# Patient Record
Sex: FEMALE | Race: WHITE | NOT HISPANIC OR LATINO | Employment: FULL TIME | URBAN - METROPOLITAN AREA
[De-identification: names, ages, dates, MRNs, and addresses within clinical notes are randomized per-mention and may not be internally consistent; named-entity substitution may affect disease eponyms.]

---

## 2019-07-22 ENCOUNTER — OFFICE VISIT (OUTPATIENT)
Dept: URGENT CARE | Facility: PHYSICIAN GROUP | Age: 39
End: 2019-07-22
Payer: COMMERCIAL

## 2019-07-22 ENCOUNTER — HOSPITAL ENCOUNTER (OUTPATIENT)
Dept: RADIOLOGY | Facility: MEDICAL CENTER | Age: 39
End: 2019-07-22
Attending: NURSE PRACTITIONER
Payer: COMMERCIAL

## 2019-07-22 VITALS
TEMPERATURE: 98.6 F | BODY MASS INDEX: 22.76 KG/M2 | HEIGHT: 67 IN | HEART RATE: 69 BPM | OXYGEN SATURATION: 97 % | SYSTOLIC BLOOD PRESSURE: 126 MMHG | WEIGHT: 145 LBS | RESPIRATION RATE: 16 BRPM | DIASTOLIC BLOOD PRESSURE: 74 MMHG

## 2019-07-22 DIAGNOSIS — R09.81 NASAL CONGESTION WITH RHINORRHEA: ICD-10-CM

## 2019-07-22 DIAGNOSIS — J45.30 MILD PERSISTENT ASTHMA WITHOUT COMPLICATION: ICD-10-CM

## 2019-07-22 DIAGNOSIS — J34.89 NASAL CONGESTION WITH RHINORRHEA: ICD-10-CM

## 2019-07-22 DIAGNOSIS — R05.9 COUGH: ICD-10-CM

## 2019-07-22 DIAGNOSIS — J06.9 URI WITH COUGH AND CONGESTION: ICD-10-CM

## 2019-07-22 DIAGNOSIS — R06.02 SOB (SHORTNESS OF BREATH): ICD-10-CM

## 2019-07-22 PROCEDURE — 71046 X-RAY EXAM CHEST 2 VIEWS: CPT

## 2019-07-22 PROCEDURE — 99204 OFFICE O/P NEW MOD 45 MIN: CPT | Performed by: NURSE PRACTITIONER

## 2019-07-22 RX ORDER — ALBUTEROL SULFATE 90 UG/1
2 AEROSOL, METERED RESPIRATORY (INHALATION) EVERY 6 HOURS PRN
Qty: 8.5 G | Refills: 0 | Status: SHIPPED | OUTPATIENT
Start: 2019-07-22 | End: 2023-10-30

## 2019-07-22 RX ORDER — FLUTICASONE PROPIONATE 50 MCG
2 SPRAY, SUSPENSION (ML) NASAL DAILY
Qty: 1 BOTTLE | Refills: 0 | Status: SHIPPED | OUTPATIENT
Start: 2019-07-22 | End: 2023-10-30

## 2019-07-22 RX ORDER — DOXYCYCLINE HYCLATE 100 MG
100 TABLET ORAL 2 TIMES DAILY
Qty: 14 TAB | Refills: 0 | Status: SHIPPED | OUTPATIENT
Start: 2019-07-22 | End: 2019-07-29

## 2019-07-22 RX ORDER — METHYLPREDNISOLONE 4 MG/1
TABLET ORAL
Qty: 1 KIT | Refills: 0 | Status: SHIPPED | OUTPATIENT
Start: 2019-07-22 | End: 2023-10-30

## 2019-07-22 NOTE — PROGRESS NOTES
"Subjective:      Leola Grant is a 38 y.o. female who presents with Cough (congestion hoarse voice x 2 weeks now Short of breath)            HPI  Cough x 1 week, loss of voice but better. SOB, chest tightness, wheeze. Albuterol not working, but states\"old\". Moved here 5 months ago from [place], [place].. States nasal congestion, runny nose, PND. States green nasal d/c and will cough this up as well. Denies fever or malaise.    PMH:  has no past medical history on file.  MEDS:   Current Outpatient Prescriptions:   •  Pseudoephedrine-APAP-DM (DAYQUIL PO), Take  by mouth., Disp: , Rfl:   •  Albuterol Sulfate 108 (90 Base) MCG/ACT AEROSOL POWDER, BREATH ACTIVATED, Inhale  by mouth., Disp: , Rfl:   •  albuterol 108 (90 Base) MCG/ACT Aero Soln inhalation aerosol, Inhale 2 Puffs by mouth every 6 hours as needed for Shortness of Breath., Disp: 8.5 g, Rfl: 0  •  methylPREDNISolone (MEDROL DOSEPAK) 4 MG Tablet Therapy Pack, Use as directed, Disp: 1 Kit, Rfl: 0  •  fluticasone (FLONASE) 50 MCG/ACT nasal spray, Spray 2 Sprays in nose every day., Disp: 1 Bottle, Rfl: 0  ALLERGIES:   Allergies   Allergen Reactions   • Pcn [Penicillins]      SURGHX: No past surgical history on file.  SOCHX:    FH: Family history was reviewed, no pertinent findings to report    Review of Systems   Constitutional: Positive for malaise/fatigue. Negative for chills and fever.   HENT: Positive for congestion. Negative for ear pain, sinus pain and sore throat.    Respiratory: Positive for cough, shortness of breath and wheezing. Negative for sputum production.    Cardiovascular: Negative for chest pain, palpitations and orthopnea.   Gastrointestinal: Negative for abdominal pain, constipation, diarrhea, nausea and vomiting.   Musculoskeletal: Negative for back pain and myalgias.   Skin: Negative for itching and rash.   Neurological: Negative for dizziness, tingling, sensory change, weakness and headaches.   Endo/Heme/Allergies: Negative for environmental " "allergies.   All other systems reviewed and are negative.         Objective:     /74   Pulse 69   Temp 37 °C (98.6 °F)   Resp 16   Ht 1.702 m (5' 7\")   Wt 65.8 kg (145 lb)   SpO2 97%   BMI 22.71 kg/m²      Physical Exam   Constitutional: She is oriented to person, place, and time. Vital signs are normal. She appears well-developed and well-nourished. She is active and cooperative.  Non-toxic appearance. She does not have a sickly appearance. She does not appear ill. No distress.   HENT:   Head: Normocephalic.   Right Ear: External ear and ear canal normal. A middle ear effusion is present.   Left Ear: External ear and ear canal normal. A middle ear effusion is present.   Nose: Mucosal edema and rhinorrhea present. No sinus tenderness.   Mouth/Throat: Uvula is midline. Mucous membranes are dry. No uvula swelling. Posterior oropharyngeal erythema present.   Eyes: Pupils are equal, round, and reactive to light. Conjunctivae and EOM are normal.   Neck: Normal range of motion. Neck supple.   Cardiovascular: Normal rate and regular rhythm.    Pulmonary/Chest: Effort normal and breath sounds normal. No accessory muscle usage. No respiratory distress. She has no decreased breath sounds. She has no wheezes. She has no rhonchi. She has no rales.   Musculoskeletal: Normal range of motion.   Lymphadenopathy:     She has no cervical adenopathy.   Neurological: She is alert and oriented to person, place, and time.   Skin: Skin is warm and dry. She is not diaphoretic.   Vitals reviewed.         CXR:FINDINGS:  The heart is normal in size.  No pulmonary infiltrates or consolidations are noted.  No pleural effusions are appreciated.     Assessment/Plan:     1. Cough    - REFERRAL TO FOLLOW-UP WITH PRIMARY CARE  - albuterol 108 (90 Base) MCG/ACT Aero Soln inhalation aerosol; Inhale 2 Puffs by mouth every 6 hours as needed for Shortness of Breath.  Dispense: 8.5 g; Refill: 0  - methylPREDNISolone (MEDROL DOSEPAK) 4 MG " Tablet Therapy Pack; Use as directed  Dispense: 1 Kit; Refill: 0  - DX-CHEST-2 VIEWS; Future    2. SOB (shortness of breath)    - REFERRAL TO FOLLOW-UP WITH PRIMARY CARE  - albuterol 108 (90 Base) MCG/ACT Aero Soln inhalation aerosol; Inhale 2 Puffs by mouth every 6 hours as needed for Shortness of Breath.  Dispense: 8.5 g; Refill: 0  - methylPREDNISolone (MEDROL DOSEPAK) 4 MG Tablet Therapy Pack; Use as directed  Dispense: 1 Kit; Refill: 0  - DX-CHEST-2 VIEWS; Future    3. Mild persistent asthma without complication  - REFERRAL TO FOLLOW-UP WITH PRIMARY CARE  - albuterol 108 (90 Base) MCG/ACT Aero Soln inhalation aerosol; Inhale 2 Puffs by mouth every 6 hours as needed for Shortness of Breath.  Dispense: 8.5 g; Refill: 0  - methylPREDNISolone (MEDROL DOSEPAK) 4 MG Tablet Therapy Pack; Use as directed  Dispense: 1 Kit; Refill: 0  - DX-CHEST-2 VIEWS; Future    4. Nasal congestion with rhinorrhea    - fluticasone (FLONASE) 50 MCG/ACT nasal spray; Spray 2 Sprays in nose every day.  Dispense: 1 Bottle; Refill: 0    5. URI with cough and congestion    - doxycycline (VIBRAMYCIN) 100 MG Tab; Take 1 Tab by mouth 2 times a day for 7 days.  Dispense: 14 Tab; Refill: 0    Increase water intake  Get rest  May use Ibuprofen/Tylenol prn for any fever, body aches or throat pain  May take longer acting antihistamine for seasonal allergy symptoms prn  May use saline nasal spray for nasal congestion  May use Flonase or Nasocort for allergen nasal congestion  May gargle with salt water prn for throat discomfort  May drink smoothies for nutrition if too painful to swallow solid foods  Monitor for productive cough, SOB and chest pain/tightness- need re-evaluation

## 2019-07-23 ASSESSMENT — ENCOUNTER SYMPTOMS
SORE THROAT: 0
MYALGIAS: 0
CONSTIPATION: 0
ORTHOPNEA: 0
VOMITING: 0
FEVER: 0
TINGLING: 0
DIARRHEA: 0
WHEEZING: 1
COUGH: 1
SPUTUM PRODUCTION: 0
SHORTNESS OF BREATH: 1
PALPITATIONS: 0
NAUSEA: 0
ABDOMINAL PAIN: 0
CHILLS: 0
SINUS PAIN: 0
WEAKNESS: 0
SENSORY CHANGE: 0
BACK PAIN: 0
HEADACHES: 0
DIZZINESS: 0

## 2019-08-21 ENCOUNTER — OFFICE VISIT (OUTPATIENT)
Dept: URGENT CARE | Facility: PHYSICIAN GROUP | Age: 39
End: 2019-08-21
Payer: COMMERCIAL

## 2019-08-21 ENCOUNTER — TELEPHONE (OUTPATIENT)
Dept: URGENT CARE | Facility: PHYSICIAN GROUP | Age: 39
End: 2019-08-21

## 2019-08-21 ENCOUNTER — HOSPITAL ENCOUNTER (OUTPATIENT)
Dept: RADIOLOGY | Facility: MEDICAL CENTER | Age: 39
End: 2019-08-21
Attending: FAMILY MEDICINE
Payer: COMMERCIAL

## 2019-08-21 VITALS
RESPIRATION RATE: 12 BRPM | TEMPERATURE: 98 F | HEIGHT: 67 IN | HEART RATE: 82 BPM | WEIGHT: 145 LBS | BODY MASS INDEX: 22.76 KG/M2 | DIASTOLIC BLOOD PRESSURE: 90 MMHG | OXYGEN SATURATION: 96 % | SYSTOLIC BLOOD PRESSURE: 122 MMHG

## 2019-08-21 DIAGNOSIS — J01.00 ACUTE NON-RECURRENT MAXILLARY SINUSITIS: ICD-10-CM

## 2019-08-21 DIAGNOSIS — J02.9 SORE THROAT: ICD-10-CM

## 2019-08-21 DIAGNOSIS — R06.02 SOB (SHORTNESS OF BREATH): ICD-10-CM

## 2019-08-21 DIAGNOSIS — R91.1 LUNG NODULE: ICD-10-CM

## 2019-08-21 LAB
INT CON NEG: NEGATIVE
INT CON POS: POSITIVE
S PYO AG THROAT QL: NEGATIVE

## 2019-08-21 PROCEDURE — 87880 STREP A ASSAY W/OPTIC: CPT | Performed by: FAMILY MEDICINE

## 2019-08-21 PROCEDURE — 71046 X-RAY EXAM CHEST 2 VIEWS: CPT

## 2019-08-21 PROCEDURE — 99214 OFFICE O/P EST MOD 30 MIN: CPT | Performed by: FAMILY MEDICINE

## 2019-08-21 RX ORDER — CIPROFLOXACIN 500 MG/1
500 TABLET, FILM COATED ORAL 2 TIMES DAILY
Qty: 14 TAB | Refills: 0 | Status: SHIPPED | OUTPATIENT
Start: 2019-08-21 | End: 2019-08-28

## 2019-08-21 RX ORDER — MOXIFLOXACIN HYDROCHLORIDE 400 MG/1
400 TABLET ORAL DAILY
Qty: 7 TAB | Refills: 0 | Status: SHIPPED | OUTPATIENT
Start: 2019-08-21 | End: 2019-08-21 | Stop reason: CLARIF

## 2019-08-21 NOTE — PROGRESS NOTES
"Chief Complaint   Patient presents with   • Chest Pain     ratteling in chest, chest heaviness, SOB, using inhaler, lethargic, lightheaded   • Pressure Behind the Eyes     pressure in eyes, congestion, sore throat         CHEST/SINUS CONGESTION  This is a new problem. The current episode started 4 wks ago. The problem has been gradually worsening.  She was seen in  on 7/22 and prescribed doxycycline - no improvement.   The problem occurs constantly. The cough is productive of clr sputum. Associated symptoms include : sore throat, headaches, fatigue, nasal congestion.    + subj fevers at home.      Pertinent negatives include no   nausea, vomiting, diarrhea, sweats, weight loss or wheezing. Exertion aggravates the symptoms.   There is no history of asthma.      Past medical history was unremarkable and not pertinent to current issue      Social History     Tobacco Use   • Smoking status: Never Smoker   • Smokeless tobacco: Never Used   Substance Use Topics   • Alcohol use: Not on file   • Drug use: Not on file             Review of Systems   Constitutional: Negative for fever, chills and weight loss.   HENT - denies  ear pain.   Positive congestion, sore throat  Eyes: denies vision changes, discharge  Respiratory: Negative for hemoptysis and wheezing.    Cardiovascular: Negative for chest pain or PND.   Gastrointestinal:  No abdominal pain,  nausea, vomiting, diarrhea.  Negative for  blood in stool.    - no discharge, dysuria, frequency.      Neurological: Negative for dizziness.   + headaches.   musculoskeletal - denies myalgias, calf pain  Psych - denies anxiety/depression/mood changes.  Skin: no itching or rash  All other systems reviewed and are negative.             Objective:     /90   Pulse 82   Temp 36.7 °C (98 °F)   Resp 12   Ht 1.702 m (5' 7\")   Wt 65.8 kg (145 lb)   SpO2 96%     Physical Exam   Constitutional: patient is oriented to person, place, and time. Patient appears well-developed " and well-nourished. No distress.   HENT:   Head: Normocephalic and atraumatic.   Right Ear: External ear normal.   Left Ear: External ear normal.   TMs both normal  Nose: Mucosal edema  present.   There is tenderness to percussion over left and right sinuses  Mouth/Throat: Mucous membranes are normal. No oral lesions.  No posterior pharyngeal erythema.  No oropharyngeal exudate or posterior oropharyngeal edema.   Eyes: Conjunctivae and EOM are normal. Pupils are equal, round, and reactive to light. Right eye exhibits no discharge. Left eye exhibits no discharge. No scleral icterus.   Neck: Normal range of motion. Neck supple. No tracheal deviation present.   Cardiovascular: Normal rate, regular rhythm and normal heart sounds.  Exam reveals no friction rub.    Pulmonary/Chest: Effort normal. No respiratory distress. Patient has no wheezes or rhonchi. Patient has no rales.    Musculoskeletal:  exhibits no edema.   Lymphadenopathy:     Patient has  cervical adenopathy.      Neurological: patient is alert and oriented to person, place, and time.   CN 2-12 intact  Skin: Skin is warm and dry. No rash noted. No erythema.   Psychiatric: patient  has a normal mood and affect.  behavior is normal.   Nursing note and vitals reviewed.         EKG interpretation - normal sinus rhythm. No ST or QRS morphology changes. QT interval is normal. Axis is positive. No signs of ischemia.       Assessment/Plan:        1. Acute non-recurrent maxillary sinusitis     - moxifloxacin (AVELOX) 400 MG Tab; Take 1 Tab by mouth every day for 7 days.  Dispense: 7 Tab; Refill: 0  - Ciclesonide (OMNARIS) 50 MCG/ACT Suspension; Spray 1 Act in nose every day.  Dispense: 1 Inhaler; Refill: 0    2. SOB (shortness of breath)  EKG normal. O2 saturation, lung exam normal.     3. Sore throat  Secondary to #1  Rapid strep neg  - POCT Rapid Strep A    4. Lung nodule  Found incidentally on CT     - REFERRAL TO PULMONOLOGY     Follow up in one week if no  improvement, sooner if symptoms worsen.

## 2019-08-22 ASSESSMENT — ENCOUNTER SYMPTOMS
DYSPNEA AT REST: 1
CHEST TIGHTNESS: 1
SHORTNESS OF BREATH: 1
HEMOPTYSIS: 0
WHEEZING: 1

## 2019-08-22 NOTE — PROGRESS NOTES
"Chief Complaint   Patient presents with   • Chest Pain                         CHEST/SINUS CONGESTION  This is a new problem. The current episode started 4 wks ago. The problem has been gradually worsening.  She was seen in  on 7/22 and prescribed doxycycline - no improvement.   The problem occurs constantly. The cough is productive of clr sputum. Associated symptoms include : sore throat, headaches, fatigue, nasal congestion.    + subj fevers at home.      Pertinent negatives include no   nausea, vomiting, diarrhea, sweats, weight loss or wheezing. Exertion aggravates the symptoms.   There is no history of asthma.      Past medical history was unremarkable and not pertinent to current issue      Social History     Tobacco Use   • Smoking status: Never Smoker   • Smokeless tobacco: Never Used   Substance Use Topics   • Alcohol use: Not on file   • Drug use: Not on file         Family history was reviewed and not pertinent           Review of Systems   Constitutional: Negative for  , chills and weight loss.   HENT - denies  ear pain.   Positive congestion, sore throat  Eyes: denies vision changes, discharge  Respiratory: Negative for hemoptysis and wheezing.    Cardiovascular: Negative for chest pain or PND.   Gastrointestinal:  No abdominal pain,  nausea, vomiting, diarrhea.  Negative for  blood in stool.    - no discharge, dysuria, frequency.      Neurological: Negative for dizziness.   + headaches.   musculoskeletal - denies myalgias, calf pain  Psych - denies anxiety/depression/mood changes.  Skin: no itching or rash  All other systems reviewed and are negative.             Objective:     /90   Pulse 82   Temp 36.7 °C (98 °F)   Resp 12   Ht 1.702 m (5' 7\")   Wt 65.8 kg (145 lb)   SpO2 96%     Physical Exam   Constitutional: patient is oriented to person, place, and time. Patient appears well-developed and well-nourished. No distress.   HENT:   Head: Normocephalic and atraumatic.   Right Ear: " External ear normal.   Left Ear: External ear normal.   TMs both normal  Nose: Mucosal edema  present.   There is tenderness to percussion over left and right sinuses  Mouth/Throat: Mucous membranes are normal. No oral lesions.  No posterior pharyngeal erythema.  No oropharyngeal exudate or posterior oropharyngeal edema.   Eyes: Conjunctivae and EOM are normal. Pupils are equal, round, and reactive to light. Right eye exhibits no discharge. Left eye exhibits no discharge. No scleral icterus.   Neck: Normal range of motion. Neck supple. No tracheal deviation present.   Cardiovascular: Normal rate, regular rhythm and normal heart sounds.  Exam reveals no friction rub.    Pulmonary/Chest: Effort normal. No respiratory distress. Patient has no wheezes or rhonchi. Patient has no rales.    Musculoskeletal:  exhibits no edema.   Lymphadenopathy:     Patient has  cervical adenopathy.      Neurological: patient is alert and oriented to person, place, and time.   CN 2-12 intact  Skin: Skin is warm and dry. No rash noted. No erythema.   Psychiatric: patient  has a normal mood and affect.  behavior is normal.   Nursing note and vitals reviewed.         EKG interpretation - normal sinus rhythm. No ST or QRS morphology changes. QT interval is normal. Axis is positive. No signs of ischemia.     Narrative       8/21/2019 5:07 PM    HISTORY/REASON FOR EXAM:  Cough    TECHNIQUE/EXAM DESCRIPTION:  PA and lateral views of the chest.    COMPARISON:  7/22/2019    FINDINGS:    The heart is not enlarged. No focal consolidation, pleural effusion or pneumothorax is identified.  Costophrenic angles are clear.  Old posttraumatic deformities are seen of left-sided ribs. There is a nodular opacity at the left lung apex measuring 6   mm.          Impression       6 mm nodular opacity at the left lung apex. Follow-up CT chest is recommended.            Assessment/Plan:         1. Acute non-recurrent maxillary sinusitis     - moxifloxacin (AVELOX)  400 MG Tab; Take 1 Tab by mouth every day for 7 days.  Dispense: 7 Tab; Refill: 0  - Ciclesonide (OMNARIS) 50 MCG/ACT Suspension; Spray 1 Act in nose every day.  Dispense: 1 Inhaler; Refill: 0    2. SOB (shortness of breath)  EKG normal.  Chest x-ray was personally interpreted and reviewed. No acute cardiopulmonary findings.  Cardiac silhouette is normal. No hemidiaphragm elevation. No bony abnormalities.      3. Sore throat  Likely from PND  Rapid strep neg      4. Lung nodule  Found incidentally on CT   - REFERRAL TO PULMONOLOGY       Follow up in one week if no improvement, sooner if symptoms worsen.

## 2019-08-23 ENCOUNTER — OFFICE VISIT (OUTPATIENT)
Dept: PULMONOLOGY | Facility: HOSPICE | Age: 39
End: 2019-08-23
Payer: COMMERCIAL

## 2019-08-23 VITALS
BODY MASS INDEX: 21.82 KG/M2 | DIASTOLIC BLOOD PRESSURE: 64 MMHG | HEIGHT: 67 IN | WEIGHT: 139 LBS | SYSTOLIC BLOOD PRESSURE: 112 MMHG | RESPIRATION RATE: 14 BRPM | HEART RATE: 68 BPM | TEMPERATURE: 98.1 F | OXYGEN SATURATION: 98 %

## 2019-08-23 DIAGNOSIS — R05.9 COUGH: ICD-10-CM

## 2019-08-23 DIAGNOSIS — J45.30 MILD PERSISTENT ASTHMA WITHOUT COMPLICATION: ICD-10-CM

## 2019-08-23 DIAGNOSIS — R91.8 PULMONARY NODULES: ICD-10-CM

## 2019-08-23 DIAGNOSIS — R06.02 SOB (SHORTNESS OF BREATH): ICD-10-CM

## 2019-08-23 PROCEDURE — 99244 OFF/OP CNSLTJ NEW/EST MOD 40: CPT | Performed by: INTERNAL MEDICINE

## 2019-08-23 RX ORDER — DOXYCYCLINE HYCLATE 100 MG/1
CAPSULE ORAL
Refills: 0 | COMMUNITY
Start: 2019-07-22 | End: 2021-03-22

## 2019-08-23 ASSESSMENT — PAIN SCALES - GENERAL: PAINLEVEL: NO PAIN

## 2019-08-23 NOTE — PROGRESS NOTES
Chief Complaint   Patient presents with   • New Patient     Abnormal Radiology       HPI:  The patient is a 38-year-old woman who says she has had a long history of asthma that has been very mild.  She has rarely needed to use albuterol as a rescue inhaler.  She is on no chronic inhaled steroids.  She moved to this area from [facility] over a year ago.  Over the past several weeks she has been complaining of a sore throat, headaches, and congestion.  She does not complain of a cough or wheezing.  She has been treated with antibiotics.  A chest x-ray was obtained in July which was normal.  A repeat chest x-ray in August was read as showing a left apical nodule.  The patient is a never smoker.  However she is concerned because there is a history of cancer in her family.  Today she still feels congested but she is not toxic appearing and not particularly short of breath at rest.  Overall, she seems to be getting better.  She has never had pulmonary function testing.    Past Medical History:   Diagnosis Date   • Asthma    • Bronchitis    • Chest tightness    • Cough    • Daytime sleepiness    • Depression    • Dizziness    • Earache    • Fatigue    • Frequent urination    • Hoarseness, persistent    • Lumps on the skin    • Morning headache    • Nasal drainage    • Painful breathing    • Rhinitis    • Shortness of breath    • Sore muscles    • Sore throat, chronic    • Toothache    • Weakness    • Wears glasses        ROS:   Constitutional: Denies fevers, chills, night sweats, fatigue or weight loss  Eyes: Denies vision loss, pain, drainage, double vision  Ears, Nose, Throat: Denies earache, tinnitus, hoarseness  Cardiovascular: Denies chest pain, tightness, palpitations  Respiratory: See HPI  Sleep: Denies, snoring, apnea  GI: Denies abdominal pain, nausea, vomiting, diarrhea  : Denies frequent urination, hematuria, painful urination  Musculoskeletal: Denies back pain, painful joints, sore  muscles  Neurological: Denies headaches, seizures  Skin: Denies rashes, color changes  Psychiatric: Denies depression or thoughts of suicide  Hematologic: Denies bleeding tendency or clotting tendency  Allergic/Immunologic: Denies rhinitis, skin sensitivity    Social History     Socioeconomic History   • Marital status: Single     Spouse name: Not on file   • Number of children: Not on file   • Years of education: Not on file   • Highest education level: Not on file   Occupational History   • Not on file   Social Needs   • Financial resource strain: Not on file   • Food insecurity:     Worry: Not on file     Inability: Not on file   • Transportation needs:     Medical: Not on file     Non-medical: Not on file   Tobacco Use   • Smoking status: Never Smoker   • Smokeless tobacco: Never Used   Substance and Sexual Activity   • Alcohol use: Yes   • Drug use: Yes     Comment: Gummies   • Sexual activity: Not on file   Lifestyle   • Physical activity:     Days per week: Not on file     Minutes per session: Not on file   • Stress: Not on file   Relationships   • Social connections:     Talks on phone: Not on file     Gets together: Not on file     Attends [employer] service: Not on file     Active member of club or organization: Not on file     Attends meetings of clubs or organizations: Not on file     Relationship status: Not on file   • Intimate partner violence:     Fear of current or ex partner: Not on file     Emotionally abused: Not on file     Physically abused: Not on file     Forced sexual activity: Not on file   Other Topics Concern   • Not on file   Social History Narrative   • Not on file     Benadryl [altaryl] and Pcn [penicillins]  Current Outpatient Medications on File Prior to Visit   Medication Sig Dispense Refill   • Pseudoephedrine-APAP-DM (DAYQUIL PO) Take  by mouth.     • albuterol 108 (90 Base) MCG/ACT Aero Soln inhalation aerosol Inhale 2 Puffs by mouth every 6 hours as needed for Shortness of  "Breath. 8.5 g 0   • fluticasone (FLONASE) 50 MCG/ACT nasal spray Spray 2 Sprays in nose every day. 1 Bottle 0   • doxycycline (VIBRAMYCIN) 100 MG Cap TAKE ONE CAPSULE BY MOUTH TWICE A DAY X 7 DAYS  0   • Ciclesonide (OMNARIS) 50 MCG/ACT Suspension Spray 1 Act in nose every day. 1 Inhaler 0   • ciprofloxacin (CIPRO) 500 MG Tab Take 1 Tab by mouth 2 times a day for 7 days. 14 Tab 0   • Albuterol Sulfate 108 (90 Base) MCG/ACT AEROSOL POWDER, BREATH ACTIVATED Inhale  by mouth.     • methylPREDNISolone (MEDROL DOSEPAK) 4 MG Tablet Therapy Pack Use as directed (Patient not taking: Reported on 8/21/2019) 1 Kit 0     No current facility-administered medications on file prior to visit.      /64   Pulse 68   Temp 36.7 °C (98.1 °F) (Oral)   Resp 14   Ht 1.702 m (5' 7\")   Wt 63 kg (139 lb)   SpO2 98%   History reviewed. No pertinent family history.    Physical Exam:    HEENT: PERRLA, EOMI, no scleral icterus, no nasal or oral lesions  Neck: No thyromegaly, no adenopathy, no bruits  M[place]ampatti: Grade II  Lungs: Equal breath sounds, no wheezes or crackles  Heart: Regular rate and rhythm, no gallops or murmurs  Abdomen: Soft, benign, no organomegaly  Extremities: No clubbing, cyanosis, or edema  Neurologic: Cranial nerve, motor, and sensory exam are normal    1. SOB (shortness of breath)    2. Cough    3. Pulmonary nodules    4. Mild persistent asthma without complication        She appears to have had a recent exacerbation of her asthma which is slowly improving.  At this point I do not see the need to add an inhaled corticosteroid.  Certainly she does not need oral steroids.  We will bring her back in several weeks for pulmonary function testing.  Her chest x-ray does show a left apical nodule.  It was probably not seen on the prior film because of location.  We will order a chest CT to further characterize the nodule.  If she does have a noncalcified nodule she will need to be followed up to establish stability.  "

## 2019-08-26 ENCOUNTER — TELEPHONE (OUTPATIENT)
Dept: PULMONOLOGY | Facility: HOSPICE | Age: 39
End: 2019-08-26

## 2019-08-26 NOTE — TELEPHONE ENCOUNTER
Patient called requesting Ct chest order placed by Dr. Beltran be faxed to [facility] per insurance purposes. States insurance does not cover [facility]. Order faxed to [facility] and copy mailed to patient.

## 2019-09-18 ENCOUNTER — APPOINTMENT (OUTPATIENT)
Dept: PULMONOLOGY | Facility: HOSPICE | Age: 39
End: 2019-09-18
Payer: COMMERCIAL

## 2021-03-21 ENCOUNTER — HOSPITAL ENCOUNTER (EMERGENCY)
Facility: MEDICAL CENTER | Age: 41
End: 2021-03-23
Attending: EMERGENCY MEDICINE

## 2021-03-21 DIAGNOSIS — T50.902A INTENTIONAL DRUG OVERDOSE, INITIAL ENCOUNTER (HCC): ICD-10-CM

## 2021-03-21 DIAGNOSIS — F10.929 ALCOHOLIC INTOXICATION WITH COMPLICATION (HCC): ICD-10-CM

## 2021-03-21 LAB
ALBUMIN SERPL BCP-MCNC: 4.5 G/DL (ref 3.2–4.9)
ALBUMIN/GLOB SERPL: 1.7 G/DL
ALP SERPL-CCNC: 60 U/L (ref 30–99)
ALT SERPL-CCNC: 12 U/L (ref 2–50)
ANION GAP SERPL CALC-SCNC: 15 MMOL/L (ref 7–16)
AST SERPL-CCNC: 20 U/L (ref 12–45)
BASOPHILS # BLD AUTO: 0.5 % (ref 0–1.8)
BASOPHILS # BLD: 0.03 K/UL (ref 0–0.12)
BILIRUB SERPL-MCNC: 0.5 MG/DL (ref 0.1–1.5)
BUN SERPL-MCNC: 5 MG/DL (ref 8–22)
CALCIUM SERPL-MCNC: 8.7 MG/DL (ref 8.5–10.5)
CHLORIDE SERPL-SCNC: 108 MMOL/L (ref 96–112)
CO2 SERPL-SCNC: 21 MMOL/L (ref 20–33)
CREAT SERPL-MCNC: 0.43 MG/DL (ref 0.5–1.4)
EOSINOPHIL # BLD AUTO: 0.03 K/UL (ref 0–0.51)
EOSINOPHIL NFR BLD: 0.5 % (ref 0–6.9)
ERYTHROCYTE [DISTWIDTH] IN BLOOD BY AUTOMATED COUNT: 37.3 FL (ref 35.9–50)
ETHANOL BLD-MCNC: 202 MG/DL (ref 0–10)
GLOBULIN SER CALC-MCNC: 2.6 G/DL (ref 1.9–3.5)
GLUCOSE SERPL-MCNC: 87 MG/DL (ref 65–99)
HCG SERPL QL: NEGATIVE
HCT VFR BLD AUTO: 42.8 % (ref 37–47)
HGB BLD-MCNC: 14.9 G/DL (ref 12–16)
IMM GRANULOCYTES # BLD AUTO: 0.01 K/UL (ref 0–0.11)
IMM GRANULOCYTES NFR BLD AUTO: 0.2 % (ref 0–0.9)
LYMPHOCYTES # BLD AUTO: 2.92 K/UL (ref 1–4.8)
LYMPHOCYTES NFR BLD: 50.8 % (ref 22–41)
MCH RBC QN AUTO: 29.8 PG (ref 27–33)
MCHC RBC AUTO-ENTMCNC: 34.8 G/DL (ref 33.6–35)
MCV RBC AUTO: 85.6 FL (ref 81.4–97.8)
MONOCYTES # BLD AUTO: 0.37 K/UL (ref 0–0.85)
MONOCYTES NFR BLD AUTO: 6.4 % (ref 0–13.4)
NEUTROPHILS # BLD AUTO: 2.39 K/UL (ref 2–7.15)
NEUTROPHILS NFR BLD: 41.6 % (ref 44–72)
NRBC # BLD AUTO: 0 K/UL
NRBC BLD-RTO: 0 /100 WBC
PLATELET # BLD AUTO: 218 K/UL (ref 164–446)
PMV BLD AUTO: 10.7 FL (ref 9–12.9)
POTASSIUM SERPL-SCNC: 3.2 MMOL/L (ref 3.6–5.5)
PROT SERPL-MCNC: 7.1 G/DL (ref 6–8.2)
RBC # BLD AUTO: 5 M/UL (ref 4.2–5.4)
SODIUM SERPL-SCNC: 144 MMOL/L (ref 135–145)
WBC # BLD AUTO: 5.8 K/UL (ref 4.8–10.8)

## 2021-03-21 PROCEDURE — 84703 CHORIONIC GONADOTROPIN ASSAY: CPT

## 2021-03-21 PROCEDURE — 94760 N-INVAS EAR/PLS OXIMETRY 1: CPT

## 2021-03-21 PROCEDURE — 80179 DRUG ASSAY SALICYLATE: CPT

## 2021-03-21 PROCEDURE — 36415 COLL VENOUS BLD VENIPUNCTURE: CPT

## 2021-03-21 PROCEDURE — 85025 COMPLETE CBC W/AUTO DIFF WBC: CPT

## 2021-03-21 PROCEDURE — 80053 COMPREHEN METABOLIC PANEL: CPT

## 2021-03-21 PROCEDURE — 93005 ELECTROCARDIOGRAM TRACING: CPT | Performed by: EMERGENCY MEDICINE

## 2021-03-21 PROCEDURE — 80143 DRUG ASSAY ACETAMINOPHEN: CPT

## 2021-03-21 PROCEDURE — 82077 ASSAY SPEC XCP UR&BREATH IA: CPT

## 2021-03-21 PROCEDURE — 99285 EMERGENCY DEPT VISIT HI MDM: CPT

## 2021-03-22 LAB
AMPHET UR QL SCN: NEGATIVE
APAP SERPL-MCNC: <5 UG/ML (ref 10–30)
BARBITURATES UR QL SCN: NEGATIVE
BENZODIAZ UR QL SCN: NEGATIVE
BZE UR QL SCN: NEGATIVE
CANNABINOIDS UR QL SCN: POSITIVE
EKG IMPRESSION: NORMAL
METHADONE UR QL SCN: NEGATIVE
OPIATES UR QL SCN: NEGATIVE
OXYCODONE UR QL SCN: NEGATIVE
PCP UR QL SCN: NEGATIVE
POC BREATHALIZER: 0.04 PERCENT (ref 0–0.01)
PROPOXYPH UR QL SCN: NEGATIVE
SALICYLATES SERPL-MCNC: <1 MG/DL (ref 15–25)

## 2021-03-22 PROCEDURE — 302970 POC BREATHALIZER: Performed by: EMERGENCY MEDICINE

## 2021-03-22 PROCEDURE — 90791 PSYCH DIAGNOSTIC EVALUATION: CPT

## 2021-03-22 PROCEDURE — 80307 DRUG TEST PRSMV CHEM ANLYZR: CPT

## 2021-03-22 PROCEDURE — 99284 EMERGENCY DEPT VISIT MOD MDM: CPT | Performed by: PSYCHIATRY & NEUROLOGY

## 2021-03-22 ASSESSMENT — LIFESTYLE VARIABLES
DOES PATIENT WANT TO STOP DRINKING: CANNOT ASSESS
DO YOU DRINK ALCOHOL: YES

## 2021-03-22 NOTE — ED PROVIDER NOTES
ED Provider Note        Primary care provider: Pcp Pt States None    I verified that the patient was wearing a mask and I was wearing appropriate PPE every time I entered the room. The patient's mask was on the patient at all times during my encounter except for a brief view of the oropharynx.      CHIEF COMPLAINT  Chief Complaint   Patient presents with   • Drug Overdose     Pt found by boyfrienclaire tonight after intentionally overdosing on medications (hx depression/anxiety), appears to have taken 1/2 a bottle of hydroxyzine 10mg tabs ([id number] tabs) and approx 15 tabs of flexeril 15mg tabs, +ETOH. Was alert and oriented on scene but is now drowsy and not answering questions.       HPI  Leola Grant is a 40 y.o. female who presents to the Emergency Department with chief complaint of drug overdose.  Patient apparently took 1510 mg Flexeril's and 1525 mg hydroxyzine ingestion time is estimated to be 10 PM this evening she also drank a large amount of alcohol.  EMS was called by boyfriend she was put on legal hold by EMS and transported here for further evaluation and treatment.  Patient is somnolent but arouses to noxious stimuli further history of present illness limited by altered mental status    REVIEW OF SYSTEMS  As per HPI otherwise limited by altered mental status    *Past medical surgical social family history meds and allergies unobtainable due to altered mental status however below information obtained from chart review.  PAST MEDICAL HISTORY   has a past medical history of Asthma, Bronchitis, Chest tightness, Cough, Daytime sleepiness, Depression, Dizziness, Earache, Fatigue, Frequent urination, Hoarseness, persistent, Lumps on the skin, Morning headache, Nasal drainage, Painful breathing, Rhinitis, Shortness of breath, Sore muscles, Sore throat, chronic, Toothache, Weakness, and Wears glasses.    SURGICAL HISTORY   has a past surgical history that includes primary c section and hysterectomy  "laparoscopy.    SOCIAL HISTORY  Social History     Tobacco Use   • Smoking status: Never Smoker   • Smokeless tobacco: Never Used   Substance Use Topics   • Alcohol use: Yes   • Drug use: Yes     Comment: Gummies      Social History     Substance and Sexual Activity   Drug Use Yes    Comment: Gummies       FAMILY HISTORY  Non-Contributory    CURRENT MEDICATIONS  Home Medications    **Home medications have not yet been reviewed for this encounter**         ALLERGIES  Allergies   Allergen Reactions   • Benadryl [Altaryl]      Manic attack   • Pcn [Penicillins]      hives       PHYSICAL EXAM  VITAL SIGNS: /75   Pulse (!) 123   Resp 16   Ht 1.651 m (5' 5\")   Wt 63 kg (139 lb)   SpO2 96%   BMI 23.13 kg/m²   Pulse ox interpretation: I interpret this pulse ox as normal.  Constitutional: Somnolent disoriented and arouses to noxious stimuli  HEENT: Atraumatic normocephalic, pupils are equal round reactive to light extraocular movements are intact. The nares is clear, external ears are normal, mouth shows dry mucous membranes  Neck: Supple, no JVD no tracheal deviation  Cardiovascular: Tachycardic no murmur rub or gallop 2+ pulses peripherally x4  Thorax & Lungs: No respiratory distress, no wheezes rales or rhonchi, No chest tenderness.   GI: Soft nontender nondistended positive bowel sounds, no peritoneal signs  Skin: Warm dry no acute rash or lesion  Musculoskeletal: Moving all extremities with full range and 5 of 5 strength, no acute  deformity  Neurologic: Moving all extremities in response to noxious stimuli  Psychiatric: Altered somnolent response to noxious stimuli      DIAGNOSTIC STUDIES / PROCEDURES  LABS      Results for orders placed or performed during the hospital encounter of 03/21/21   HCG Qual Serum   Result Value Ref Range    Beta-Hcg Qualitative Serum Negative Negative   Blood Alcohol   Result Value Ref Range    Diagnostic Alcohol 202.0 (H) 0.0 - 10.0 mg/dL   CBC WITH DIFFERENTIAL   Result Value " Ref Range    WBC 5.8 4.8 - 10.8 K/uL    RBC 5.00 4.20 - 5.40 M/uL    Hemoglobin 14.9 12.0 - 16.0 g/dL    Hematocrit 42.8 37.0 - 47.0 %    MCV 85.6 81.4 - 97.8 fL    MCH 29.8 27.0 - 33.0 pg    MCHC 34.8 33.6 - 35.0 g/dL    RDW 37.3 35.9 - 50.0 fL    Platelet Count 218 164 - 446 K/uL    MPV 10.7 9.0 - 12.9 fL    Neutrophils-Polys 41.60 (L) 44.00 - 72.00 %    Lymphocytes 50.80 (H) 22.00 - 41.00 %    Monocytes 6.40 0.00 - 13.40 %    Eosinophils 0.50 0.00 - 6.90 %    Basophils 0.50 0.00 - 1.80 %    Immature Granulocytes 0.20 0.00 - 0.90 %    Nucleated RBC 0.00 /100 WBC    Neutrophils (Absolute) 2.39 2.00 - 7.15 K/uL    Lymphs (Absolute) 2.92 1.00 - 4.80 K/uL    Monos (Absolute) 0.37 0.00 - 0.85 K/uL    Eos (Absolute) 0.03 0.00 - 0.51 K/uL    Baso (Absolute) 0.03 0.00 - 0.12 K/uL    Immature Granulocytes (abs) 0.01 0.00 - 0.11 K/uL    NRBC (Absolute) 0.00 K/uL   COMP METABOLIC PANEL   Result Value Ref Range    Sodium 144 135 - 145 mmol/L    Potassium 3.2 (L) 3.6 - 5.5 mmol/L    Chloride 108 96 - 112 mmol/L    Co2 21 20 - 33 mmol/L    Anion Gap 15.0 7.0 - 16.0    Glucose 87 65 - 99 mg/dL    Bun 5 (L) 8 - 22 mg/dL    Creatinine 0.43 (L) 0.50 - 1.40 mg/dL    Calcium 8.7 8.5 - 10.5 mg/dL    AST(SGOT) 20 12 - 45 U/L    ALT(SGPT) 12 2 - 50 U/L    Alkaline Phosphatase 60 30 - 99 U/L    Total Bilirubin 0.5 0.1 - 1.5 mg/dL    Albumin 4.5 3.2 - 4.9 g/dL    Total Protein 7.1 6.0 - 8.2 g/dL    Globulin 2.6 1.9 - 3.5 g/dL    A-G Ratio 1.7 g/dL   ESTIMATED GFR   Result Value Ref Range    GFR If African American >60 >60 mL/min/1.73 m 2    GFR If Non African American >60 >60 mL/min/1.73 m 2   EKG   Result Value Ref Range    Report       [facility] Emergency Dept.    Test Date:  2021  Pt Name:    ANA STREETER              Department: ER  MRN:        [id number]                      Room:       [facility]  Gender:     Female                       Technician: [id number]  :        1980                   Requested  "By:ER TRIAGE PROTOCOL  Order #:    [id number]                    Reading MD:    Measurements  Intervals                                Axis  Rate:       122                          P:          76  ND:         184                          QRS:        19  QRSD:       68                           T:          -84  QT:         256  QTc:        365    Interpretive Statements  INCOMPLETE ANALYSIS DUE TO MISSING DATA IN PRECORDIAL LEAD(S)  SINUS TACHYCARDIA  PROBABLE LEFT ATRIAL ABNORMALITY  LOW VOLTAGE IN FRONTAL LEADS  BORDERLINE T ABNORMALITIES, DIFFUSE LEADS  MISSING LEAD(S): V4  No previous ECG available for comparison         All labs reviewed by me.        COURSE & MEDICAL DECISION MAKING  Pertinent Labs & Imaging studies reviewed. (See chart for details)    11:14 PM - Patient seen and examined at bedside.       Patient noted to have slightly elevated blood pressure likely circumstantial secondary to presenting complaint. Referred to primary care physician for further evaluation.        Medical Decision Makin-year-old female reportedly took overdose of Flexeril and hydroxyzine this evening she was slightly tachycardic at arrival she is had no other anticholinergic toxidrome no QRS or QT prolongation doing otherwise well alcohol level is over 200 she has been observed for several hours and stable.  Poison control recommended 8-hour observation.  She is required no benzodiazepines during my observation.  At this point she still pending sobriety from alcohol and when this is achieved she will be further question of the intentions of her ingestion this evening.  She will likely require ongoing evaluation and possible psychiatric evaluation/placement I discussed.  I discussed this with my partner at time of shift change and care is transferred in guarded condition.    BP (!) 96/58   Pulse 84   Temp 36.7 °C (98 °F) (Temporal)   Resp (!) 24   Ht 1.651 m (5' 5\")   Wt 63 kg (139 lb)   SpO2 92%   BMI 23.13 kg/m² "             FINAL IMPRESSION  1. Intentional drug overdose, initial encounter (HCC) Active   2. Alcoholic intoxication with complication (HCC) Active          This dictation has been created using voice recognition software and/or scribes. The accuracy of the dictation is limited by the abilities of the software and the expertise of the scribes. I expect there may be some errors of grammar and possibly content. I made every attempt to manually correct the errors within my dictation. However, errors related to voice recognition software and/or scribes may still exist and should be interpreted within the appropriate context.

## 2021-03-22 NOTE — ED NOTES
Report given to ARY Beltre. Pt moved to [address] on [facility], in direct view of caleb ([facility]).

## 2021-03-22 NOTE — ED NOTES
Patient's home medications have been reviewed by the pharmacy team. Pt states she does not take any medications at home.     Past Medical History:   Diagnosis Date   • Asthma    • Bronchitis    • Chest tightness    • Cough    • Daytime sleepiness    • Depression    • Dizziness    • Earache    • Fatigue    • Frequent urination    • Hoarseness, persistent    • Lumps on the skin    • Morning headache    • Nasal drainage    • Painful breathing    • Rhinitis    • Shortness of breath    • Sore muscles    • Sore throat, chronic    • Toothache    • Weakness    • Wears glasses        Patient's Medications   New Prescriptions    No medications on file   Previous Medications    ALBUTEROL 108 (90 BASE) MCG/ACT AERO SOLN INHALATION AEROSOL    Inhale 2 Puffs by mouth every 6 hours as needed for Shortness of Breath.    CICLESONIDE (OMNARIS) 50 MCG/ACT SUSPENSION    Spray 1 Act in nose every day.    FLUTICASONE (FLONASE) 50 MCG/ACT NASAL SPRAY    Spray 2 Sprays in nose every day.    METHYLPREDNISOLONE (MEDROL DOSEPAK) 4 MG TABLET THERAPY PACK    Use as directed   Modified Medications    No medications on file   Discontinued Medications    ALBUTEROL SULFATE 108 (90 BASE) MCG/ACT AEROSOL POWDER, BREATH ACTIVATED    Inhale  by mouth.    DOXYCYCLINE (VIBRAMYCIN) 100 MG CAP    TAKE ONE CAPSULE BY MOUTH TWICE A DAY X 7 DAYS    PSEUDOEPHEDRINE-APAP-DM (DAYQUIL PO)    Take  by mouth.     A:  Medications do not appear to be contributing to current complaints.       P:    No recommendations at this time. Will follow Psychiatry's recommendations.     Tracey Stewart, PharmD, BCPS

## 2021-03-22 NOTE — ED NOTES
Pt given water to drink. To be moved to green 35 & remain on 1:1 direct observation. Plan is for pt to be reassessed at 0800/medically cleared, then ALERT team assessment. Pt aware of plan.

## 2021-03-22 NOTE — ED NOTES
Security called to search belongings, both removed from pt on arrival and duffel bag dropped off by partner, Dakota.  Pt's daughter - Dale Grant - will be stopping by to  keys from duffel bag as herself and pt share an apartment, and partner Dakota has to leave back to [place]. Security aware.

## 2021-03-22 NOTE — ED NOTES
Pt resting with eyes closed. Even et unlabored resp noted @ this time. Pt remains on monitor. Will cont to monitor pt.

## 2021-03-22 NOTE — ED PROVIDER NOTES
ED Provider Note    3/22/2021 7:54 AM -patient initially evaluated earlier this evening by Dr. Young, status post Atarax and Flexeril overdose.  The patient has been observed in the emergency room for several hours now has not had any decompensation she is medically cleared for psychiatric evaluation, legal hold has been completed by Dr. Young.  No adverse events reported.      2:22 PM-legal hold completed.  Patient stable for transfer.

## 2021-03-22 NOTE — ED NOTES
Pt cont to rest with eyes closed sitter remains in place. Will cont to monitor pt. Diet ordered and waiting for meal @ this time

## 2021-03-22 NOTE — ED NOTES
Pt blew a 0.045 alert team is aware. Will continue to monitor. Sitter Cheikh is sitting outside of pt room.

## 2021-03-22 NOTE — DISCHARGE PLANNING
Alert Team  Confirmed with PFA/PAR that pt's [facility] insurance policy inactive; per [facility] website, her policy termed 9/2019.

## 2021-03-22 NOTE — ED NOTES
Urine collected et sent to lab @ this time. Pt ambulate to restroom and back with SBA. Pt tolerated well. Pt awake and sitting up

## 2021-03-22 NOTE — ED NOTES
Pt continues to rest with eyes closed, equal chest rise and fall, NSR on monitor. Remains in direct view of 1:1 sitter ER Tech - Cheikh.

## 2021-03-22 NOTE — ED NOTES
Dr. Guerrier assessed. Pt not responding to questions or commands, squirming, drowsy. Protecting own airway. All clothing removed from pt and placed in belonging bag, charge RN aware of need for 1:1 sitter for high  SI risk. All items removed from room except for essential monitoring equipment and suction/Ambubag.

## 2021-03-22 NOTE — ED NOTES
Repot given by Kane. Pt will move to room 35. Per report p will be medically cleared at 8AM to talk to alert team. Pt is on a legal hold as of now.

## 2021-03-22 NOTE — ED NOTES
"Pt's eyes open - now oriented x4, pt states she knows why she is here and states she doesn't feel suicidal, states she thinks \"it was just a moment\", due to alcohol. Explained legal hold, pt agreeable/understanding. Asking for water. Denies h/a or nausea. Dr. Young aware.  "

## 2021-03-22 NOTE — DISCHARGE PLANNING
"Alert Team  Spoke with PFA about this pt, who felt she definitely had insurance.  She does not currently have insurance.  [facility], [facility] and [facility] show no active commercial policies.  PFA reports pt does not qualify for Medicaid d/t \"makes too much.\"      "

## 2021-03-22 NOTE — ED NOTES
Pt sitting up and alert per this nurse allowed pt to use her cell phone to call work and update her daughter. Per alert team pt remains a hold and will be placed in patient. Pt is polite and cooperatve @ this time

## 2021-03-22 NOTE — DISCHARGE PLANNING
Medical Social Work    Referral: Legal Hold    Intervention: Legal Hold Paperwork given to SW by [employer] RN Kacy Ty    Legal Hold Initiated: Date: 03- Time: 2238    Patient’s Insurance Listed on Face Sheet: None    Referrals sent to: [facility]    This referral contains the following information:  1) Face sheet __x__  2) Page 1 and Page 2 of Legal Hold __x__  3) Alert Team Assessment/Psych Assessment __x__  4) Head to toe physical exam __x__  5) Urine Drug Screen _x___  6) Blood Alcohol _x___  7) Vital signs __x__  8) Pregnancy test when applicable _NA__  9) Medications list __x__    Plan: Patient will transfer to mental health facility once acceptance is obtained
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
## 2021-03-22 NOTE — ED TRIAGE NOTES
Leola Grant  40 y.o.  female  Chief Complaint   Patient presents with   • Drug Overdose     Pt found by boyfriend tonight after intentionally overdosing on medications (hx depression/anxiety), appears to have taken 1/2 a bottle of hydroxyzine 10mg tabs ([id number] tabs) and approx 15 tabs of flexeril 5mg tabs, +ETOH. Was alert and oriented on scene but is now drowsy and not answering questions.       Approx 500 mL NS with EMS.

## 2021-03-22 NOTE — ED NOTES
Med Rec complete per Pt at bedside  Allergies reviewed  No oral ABX in the last 14 days.    Pt states she does not take any medications at home.

## 2021-03-22 NOTE — ED NOTES
Poison control contacted - case # [id number]. Advised to monitor for tachycardia/agitation/seizure/urinary retention from anticholinergic response, recommending 8 hr observation at this time +cardiac monitoring for tachycardia and seizure precautions, benzos prn for same.

## 2021-03-22 NOTE — ED NOTES
Report from Patt MCALLISTER. Assumed pt care @ this time. 1:1 observation still @ this time. Report to current 1:1 sitter. Pt resting with eyes closed. Pt does wake to voice. Pt currenlty not requesting any needs @ this time.Will cont to monitor pt
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
## 2021-03-22 NOTE — DISCHARGE PLANNING
Filed petition to the court via [employer]. Waiting on verified petition.    Received verified petition from the court. Sent copy to unit TAMRAW.

## 2021-03-22 NOTE — ED NOTES
Break RN: Pt appears to be sleeping w/ even chest rise and fall. Ellie lloyd in direct view of patient.

## 2021-03-22 NOTE — ED NOTES
Pt belongings searched by security and placed in Locker 13 with facesheets & pt labels. Pink duffel bag dropped off by partner Dakota placed outside of locker (large).

## 2021-03-22 NOTE — ED NOTES
Pt now opening eyes spontaneously, remains drowsy, mumbling responses but following commands. Unable to complete full neuro assessment as pt refusing to answer some questions - is oriented to place, self and year.

## 2021-03-22 NOTE — ED NOTES
"Pt's partner Dakota called - informed that staff could not give him any information on pt status as he is not on file as her primary contact. Dakota informed this RN that himself and pt were away on a trip, \"trying to mend things\", pt was drinking all day, became ++ intoxicated and became agitated, then took pills shortly after (not visualized by Dakota, but he found empty bottles). States that pt never expressed intent to harm self/suicidal thoughts/etc. Dakota is going to drop off pt's phone and \"care package\", informed that same could not be given to pt at this time but would be stored safely in a locker.    Dakota - partner # [phone].  "

## 2021-03-23 VITALS
OXYGEN SATURATION: 95 % | SYSTOLIC BLOOD PRESSURE: 120 MMHG | TEMPERATURE: 98 F | WEIGHT: 139 LBS | HEIGHT: 65 IN | HEART RATE: 80 BPM | BODY MASS INDEX: 23.16 KG/M2 | DIASTOLIC BLOOD PRESSURE: 85 MMHG | RESPIRATION RATE: 16 BRPM

## 2021-03-23 NOTE — ED PROVIDER NOTES
"ED Provider Note    8:58 AM    Subjective:  Patient is awaiting transfer to psychiatric facility.  She has no complaints.  She has been cooperative    Objective: BP (!) 95/55   Pulse 79   Temp 36.7 °C (98 °F) (Temporal)   Resp 18   Ht 1.651 m (5' 5\")   Wt 63 kg (139 lb)   SpO2 95%   BMI 23.13 kg/m² .  Calm and cooperative. Generally nontoxic. No respiratory distress.No skin rash. Extremities unremarkable.    Laboratory data was reviewed.    Assesment/Plan:   1.  Intentional overdose-proceed with plan for psychiatric care      Electronically signed by: Wong Peck M.D., 3/23/2021 8:58 AM    "

## 2021-03-23 NOTE — ED NOTES
Sister Soumya updated on plan of care, okayed with pt. Pt given cell phone  per request. Soumya number [phone]

## 2021-03-23 NOTE — ED NOTES
Bed linen changed, pt provided with toothbrush and toothpaste for oral hygiene. Vitals rechecked, VSS. Pt resting with no needs at this time

## 2021-03-23 NOTE — DISCHARGE PLANNING
Kayla from [facility] called and they will accept Pt.   Dr Nance will be admitting physician.    [facility] [employer] arranged for Legal Hold Transfer  Transfer time will be [id number]    Transfer Packet completed with Original Legal Hold placed inside.  RN updated on transfer and transfer time.     Syed at [facility] updated on [facility] [employer] time.

## 2021-03-23 NOTE — DISCHARGE PLANNING
Legal Hold    Referral: Legal Hold Court     Intervention: Pt presented for legal hold meeting with .  advised pt will meet with court MD's via telemedicine monitor to contest the legal hold.      Plan: Pt will present to telemedicine mental health to meet with court physicians 3/24. Will call bedside RN once time has been determined.

## 2021-03-23 NOTE — ED NOTES
Pt sleeping comfortably in bed, no signs of distress, even rise and fall of chest. Sitter within direct view of pt.

## 2021-03-23 NOTE — DISCHARGE PLANNING
Alert Team  Pt not due to be seen by psychiatry today and likely won't be d/t high pt volume.  She was seen yesterday and LH extended by psychiatry.    Pt continues to minimize, focusing on discharge.  I have provided pt with CSP to fill out and resources for psychiatry/counseling to look over to start planning for f/u.  I have given her a few handouts about depression and SAs.  WCTM

## 2021-03-23 NOTE — DISCHARGE PLANNING
Alert Team:    Patient rested throughout the night without incident with sitter directly observing for patient safety outside of room. Patient seen by psychiatry yesterday and legal hold extended and continues to await transfer to [facility] due to uninsured status. Alert Team will continue to monitor.

## 2021-03-23 NOTE — ED NOTES
Report received from ARY Bird. Assumed care of pt, pt resting in bed, 1:1 sitter in direct view of pt.

## 2021-03-23 NOTE — DISCHARGE PLANNING
"Alert Team  Discussed dc planning with pt, in case she is released from this ER prior to transfer.  SW has not heard back about [facility].  Pt more accepting of advising from staff, even agreeing, after declining yesterday, to start some psych meds.  \"I know I need to be on them.  I have been in the past and will try them again.\"  CSP done; resource list given and articles about SA/etoh use given to pt.  Message psychiatrist to let her know pt willing to start meds.    [facility]  Crisis/Safety Plan    Name:  Leola Grant  MRN:  [id number]  Date:  3/23/2021    Warning signs that a crisis may be developing for me or I may be at risk:  1) sudden sadness  2) extreme anxiety  3)feeling overwhelmed    Coping strategies I can use on my own (relaxation, physical activity, etc):  1) going for a walk  2) reading a book  3) listen to calming music    Ways I can make my environment safe:  1)get out more for fresh air  2)get rid of old medications in the home  3)let more light in home    Things I want to tell myself when I feel a crisis developin) It's not worth it  2) You are loved  3)Never give up    People I can contact for support or distraction (and their phone numbers):  1) Enmanuel Bravo [phone]  2) Alee Baird  [phone]  3)Kavitha Bravo [phone]    If I’m not able to reach my support people, or the above strategies don’t help, I can contact the following professionals, agencies, or hotlines:  1) Crisis Call Center ():  [phone] -OR- [phone]  2) Crisis Text Line ():  Text CARE TO 141474  3) [facility] [phone]  4) [facility]/see resource list for #    Kacy Ty R.N.    "

## 2022-10-12 ENCOUNTER — APPOINTMENT (OUTPATIENT)
Dept: RADIOLOGY | Facility: MEDICAL CENTER | Age: 42
End: 2022-10-12
Attending: EMERGENCY MEDICINE

## 2022-10-12 ENCOUNTER — HOSPITAL ENCOUNTER (EMERGENCY)
Facility: MEDICAL CENTER | Age: 42
End: 2022-10-12
Attending: EMERGENCY MEDICINE

## 2022-10-12 VITALS
SYSTOLIC BLOOD PRESSURE: 109 MMHG | RESPIRATION RATE: 18 BRPM | HEIGHT: 67 IN | BODY MASS INDEX: 15.54 KG/M2 | DIASTOLIC BLOOD PRESSURE: 77 MMHG | OXYGEN SATURATION: 98 % | HEART RATE: 51 BPM | TEMPERATURE: 98 F | WEIGHT: 98.99 LBS

## 2022-10-12 DIAGNOSIS — R55 SYNCOPE, UNSPECIFIED SYNCOPE TYPE: ICD-10-CM

## 2022-10-12 LAB
ALBUMIN SERPL BCP-MCNC: 4.4 G/DL (ref 3.2–4.9)
ALBUMIN/GLOB SERPL: 1.8 G/DL
ALP SERPL-CCNC: 51 U/L (ref 30–99)
ALT SERPL-CCNC: 9 U/L (ref 2–50)
ANION GAP SERPL CALC-SCNC: 12 MMOL/L (ref 7–16)
AST SERPL-CCNC: 20 U/L (ref 12–45)
BASOPHILS # BLD AUTO: 0.5 % (ref 0–1.8)
BASOPHILS # BLD: 0.04 K/UL (ref 0–0.12)
BILIRUB SERPL-MCNC: 0.9 MG/DL (ref 0.1–1.5)
BUN SERPL-MCNC: 13 MG/DL (ref 8–22)
CALCIUM SERPL-MCNC: 9.2 MG/DL (ref 8.5–10.5)
CHLORIDE SERPL-SCNC: 106 MMOL/L (ref 96–112)
CO2 SERPL-SCNC: 22 MMOL/L (ref 20–33)
CREAT SERPL-MCNC: 0.45 MG/DL (ref 0.5–1.4)
D DIMER PPP IA.FEU-MCNC: 0.28 UG/ML (FEU) (ref 0–0.5)
EKG IMPRESSION: NORMAL
EOSINOPHIL # BLD AUTO: 0.04 K/UL (ref 0–0.51)
EOSINOPHIL NFR BLD: 0.5 % (ref 0–6.9)
ERYTHROCYTE [DISTWIDTH] IN BLOOD BY AUTOMATED COUNT: 37.9 FL (ref 35.9–50)
GFR SERPLBLD CREATININE-BSD FMLA CKD-EPI: 123 ML/MIN/1.73 M 2
GLOBULIN SER CALC-MCNC: 2.5 G/DL (ref 1.9–3.5)
GLUCOSE SERPL-MCNC: 83 MG/DL (ref 65–99)
HCG SERPL QL: NEGATIVE
HCT VFR BLD AUTO: 42.2 % (ref 37–47)
HGB BLD-MCNC: 14.8 G/DL (ref 12–16)
IMM GRANULOCYTES # BLD AUTO: 0.01 K/UL (ref 0–0.11)
IMM GRANULOCYTES NFR BLD AUTO: 0.1 % (ref 0–0.9)
LYMPHOCYTES # BLD AUTO: 2.34 K/UL (ref 1–4.8)
LYMPHOCYTES NFR BLD: 31.1 % (ref 22–41)
MCH RBC QN AUTO: 30.1 PG (ref 27–33)
MCHC RBC AUTO-ENTMCNC: 35.1 G/DL (ref 33.6–35)
MCV RBC AUTO: 85.8 FL (ref 81.4–97.8)
MONOCYTES # BLD AUTO: 0.46 K/UL (ref 0–0.85)
MONOCYTES NFR BLD AUTO: 6.1 % (ref 0–13.4)
NEUTROPHILS # BLD AUTO: 4.64 K/UL (ref 2–7.15)
NEUTROPHILS NFR BLD: 61.7 % (ref 44–72)
NRBC # BLD AUTO: 0 K/UL
NRBC BLD-RTO: 0 /100 WBC
NT-PROBNP SERPL IA-MCNC: 40 PG/ML (ref 0–125)
PLATELET # BLD AUTO: 226 K/UL (ref 164–446)
PMV BLD AUTO: 11.2 FL (ref 9–12.9)
POTASSIUM SERPL-SCNC: 3.5 MMOL/L (ref 3.6–5.5)
PROT SERPL-MCNC: 6.9 G/DL (ref 6–8.2)
RBC # BLD AUTO: 4.92 M/UL (ref 4.2–5.4)
SODIUM SERPL-SCNC: 140 MMOL/L (ref 135–145)
WBC # BLD AUTO: 7.5 K/UL (ref 4.8–10.8)

## 2022-10-12 PROCEDURE — 70450 CT HEAD/BRAIN W/O DYE: CPT

## 2022-10-12 PROCEDURE — 94760 N-INVAS EAR/PLS OXIMETRY 1: CPT

## 2022-10-12 PROCEDURE — 93005 ELECTROCARDIOGRAM TRACING: CPT

## 2022-10-12 PROCEDURE — 85379 FIBRIN DEGRADATION QUANT: CPT

## 2022-10-12 PROCEDURE — 36415 COLL VENOUS BLD VENIPUNCTURE: CPT

## 2022-10-12 PROCEDURE — 99284 EMERGENCY DEPT VISIT MOD MDM: CPT

## 2022-10-12 PROCEDURE — 700105 HCHG RX REV CODE 258: Performed by: EMERGENCY MEDICINE

## 2022-10-12 PROCEDURE — 84703 CHORIONIC GONADOTROPIN ASSAY: CPT

## 2022-10-12 PROCEDURE — 93005 ELECTROCARDIOGRAM TRACING: CPT | Performed by: EMERGENCY MEDICINE

## 2022-10-12 PROCEDURE — 80053 COMPREHEN METABOLIC PANEL: CPT

## 2022-10-12 PROCEDURE — 84443 ASSAY THYROID STIM HORMONE: CPT

## 2022-10-12 PROCEDURE — 71045 X-RAY EXAM CHEST 1 VIEW: CPT

## 2022-10-12 PROCEDURE — 85025 COMPLETE CBC W/AUTO DIFF WBC: CPT

## 2022-10-12 PROCEDURE — 83880 ASSAY OF NATRIURETIC PEPTIDE: CPT

## 2022-10-12 RX ORDER — SODIUM CHLORIDE 9 MG/ML
1000 INJECTION, SOLUTION INTRAVENOUS ONCE
Status: COMPLETED | OUTPATIENT
Start: 2022-10-12 | End: 2022-10-12

## 2022-10-12 RX ADMIN — SODIUM CHLORIDE 1000 ML: 9 INJECTION, SOLUTION INTRAVENOUS at 17:45

## 2022-10-12 ASSESSMENT — FIBROSIS 4 INDEX: FIB4 SCORE: 1.88

## 2022-10-12 NOTE — ED TRIAGE NOTES
Chief Complaint   Patient presents with    Syncope     Pt had episode of syncope while walking in [place] two days ago.  Hit L side of face.  Uncertain of how long she lost consciousness.       Pt reports L side of face is sore.  She is anxious and tearful in triage.  No s/s of neuro deficit.

## 2022-10-13 LAB — TSH SERPL DL<=0.005 MIU/L-ACNC: 1 UIU/ML (ref 0.38–5.33)

## 2022-10-13 NOTE — ED PROVIDER NOTES
ED Provider Note    CHIEF COMPLAINT  Chief Complaint   Patient presents with    Syncope     Pt had episode of syncope while walking in [place] two days ago.  Hit L side of face.  Uncertain of how long she lost consciousness.         HPI  Leola He is a 41 y.o. female who presents to the emergency department after syncopal episode.  Past medical history as documented below.  She notes that over the last couple weeks has been feeling generally unwell, fatigued and somewhat jittery all over.  She notes that even in a more prolonged states she has had extreme difficulty losing weight and has recurrently lost weight and has had significant difficulty keeping weight on.    A few nights ago she was working on the computer for prolonged hours upwards of 8 to 9 hours.  She then started to feel well and lightheaded dizzy.  She tried to make it to the bathroom as she felt like she might pass out.  After walking up the stairs she then did pass out and believes she hit her head on some nearby object.  She is completely amnestic to this event or event further conversation throughout that evening.  Now has continued to feel unwell and ultimately decided come to the emergency department for further care and work-up.    Currently her primary complaint is generalized fatigue.    She additionally notes that she has had a neck mass which was thought to be possible thyroid nodule which she was supposed to get followed up however she has not.  She does not currently have a primary care physician.  Denies any chest pain or palpitations.    REVIEW OF SYSTEMS  See HPI for further details. All other systems are negative.     PAST MEDICAL HISTORY   has a past medical history of Asthma, Bronchitis, Chest tightness, Cough, Daytime sleepiness, Depression, Dizziness, Earache, Fatigue, Frequent urination, Hoarseness, persistent, Lumps on the skin, Morning headache, Nasal drainage, Painful breathing, Rhinitis, Shortness of breath, Sore  "muscles, Sore throat, chronic, Toothache, Weakness, and Wears glasses.    SOCIAL HISTORY  Social History     Tobacco Use    Smoking status: Never    Smokeless tobacco: Never   Vaping Use    Vaping Use: Never used   Substance and Sexual Activity    Alcohol use: Yes    Drug use: Yes     Comment: Gummies    Sexual activity: Not on file       SURGICAL HISTORY   has a past surgical history that includes primary c section and hysterectomy laparoscopy.    CURRENT MEDICATIONS  Home Medications       Reviewed by Grace Chery R.N. (Registered Nurse) on 10/12/22 at 1648  Med List Status: Not Addressed     Medication Last Dose Status   albuterol 108 (90 Base) MCG/ACT Aero Soln inhalation aerosol  Active   Ciclesonide (OMNARIS) 50 MCG/ACT Suspension  Active   fluticasone (FLONASE) 50 MCG/ACT nasal spray  Active   methylPREDNISolone (MEDROL DOSEPAK) 4 MG Tablet Therapy Pack  Active                    ALLERGIES  Allergies   Allergen Reactions    Benadryl [Altaryl]      Manic attack    Pcn [Penicillins]      hives       PHYSICAL EXAM  VITAL SIGNS: /77   Pulse (!) 51   Temp 36.7 °C (98 °F) (Temporal)   Resp 18   Ht 1.702 m (5' 7\")   Wt 44.9 kg (98 lb 15.8 oz)   SpO2 98%   BMI 15.50 kg/m²  @YOVANI[[id number]::@   Pulse ox interpretation: I interpret this pulse ox as normal.  Constitutional: Alert in no apparent distress.  HENT: No signs of trauma, Bilateral external ears normal, Nose normal.   Eyes: Pupils are equal and reactive, Conjunctiva normal, Non-icteric.   Neck: Normal range of motion, No tenderness, Supple.  This nodular density appreciated to the right of midline and in location of thyroid.  Cardiovascular: Regular rate and rhythm, no murmurs.   Thorax & Lungs: Normal breath sounds, No respiratory distress, No wheezing, No chest tenderness.   Abdomen: Bowel sounds normal, Soft, No tenderness, No masses, No pulsatile masses. No peritoneal signs.  Skin: Warm, Dry, No erythema, No rash.   Back: No bony tenderness, " No CVA tenderness.   Extremities: Intact distal pulses, No edema, No tenderness, No cyanosis,  Negative Marlon's sign.   Musculoskeletal: Good range of motion in all major joints. No tenderness to palpation or major deformities noted.   Neurologic: Alert , Normal motor function, Normal sensory function, No focal deficits noted.   Psychiatric: Affect normal, Judgment normal, Mood normal.       DIAGNOSTIC STUDIES / PROCEDURES      LABS  Results for orders placed or performed during the hospital encounter of 10/12/22   HCG Qual Serum   Result Value Ref Range    Beta-Hcg Qualitative Serum Negative Negative   CBC w/ Differential   Result Value Ref Range    WBC 7.5 4.8 - 10.8 K/uL    RBC 4.92 4.20 - 5.40 M/uL    Hemoglobin 14.8 12.0 - 16.0 g/dL    Hematocrit 42.2 37.0 - 47.0 %    MCV 85.8 81.4 - 97.8 fL    MCH 30.1 27.0 - 33.0 pg    MCHC 35.1 (H) 33.6 - 35.0 g/dL    RDW 37.9 35.9 - 50.0 fL    Platelet Count 226 164 - 446 K/uL    MPV 11.2 9.0 - 12.9 fL    Neutrophils-Polys 61.70 44.00 - 72.00 %    Lymphocytes 31.10 22.00 - 41.00 %    Monocytes 6.10 0.00 - 13.40 %    Eosinophils 0.50 0.00 - 6.90 %    Basophils 0.50 0.00 - 1.80 %    Immature Granulocytes 0.10 0.00 - 0.90 %    Nucleated RBC 0.00 /100 WBC    Neutrophils (Absolute) 4.64 2.00 - 7.15 K/uL    Lymphs (Absolute) 2.34 1.00 - 4.80 K/uL    Monos (Absolute) 0.46 0.00 - 0.85 K/uL    Eos (Absolute) 0.04 0.00 - 0.51 K/uL    Baso (Absolute) 0.04 0.00 - 0.12 K/uL    Immature Granulocytes (abs) 0.01 0.00 - 0.11 K/uL    NRBC (Absolute) 0.00 K/uL   Complete Metabolic Panel (CMP)   Result Value Ref Range    Sodium 140 135 - 145 mmol/L    Potassium 3.5 (L) 3.6 - 5.5 mmol/L    Chloride 106 96 - 112 mmol/L    Co2 22 20 - 33 mmol/L    Anion Gap 12.0 7.0 - 16.0    Glucose 83 65 - 99 mg/dL    Bun 13 8 - 22 mg/dL    Creatinine 0.45 (L) 0.50 - 1.40 mg/dL    Calcium 9.2 8.5 - 10.5 mg/dL    AST(SGOT) 20 12 - 45 U/L    ALT(SGPT) 9 2 - 50 U/L    Alkaline Phosphatase 51 30 - 99 U/L    Total  Bilirubin 0.9 0.1 - 1.5 mg/dL    Albumin 4.4 3.2 - 4.9 g/dL    Total Protein 6.9 6.0 - 8.2 g/dL    Globulin 2.5 1.9 - 3.5 g/dL    A-G Ratio 1.8 g/dL   proBrain Natriuretic Peptide, NT   Result Value Ref Range    NT-proBNP 40 0 - 125 pg/mL   D-Dimer (only helpful in low pre-test probability wells critieria. Do not order if patient ruled out by PERC criteria. See Weblinks at top of Labs section)   Result Value Ref Range    D-Dimer Screen 0.28 0.00 - 0.50 ug/mL (FEU)   ESTIMATED GFR   Result Value Ref Range    GFR (CKD-EPI) 123 >60 mL/min/1.73 m 2   EKG (NOW)   Result Value Ref Range    Report       [facility] Emergency Dept.    Test Date:  2022-10-12  Pt Name:    ANA COBIAN             Department: ER  MRN:        [id number]                      Room:  Gender:     Female                       Technician: [id number]  :        1980                   Requested By:ER TRIAGE PROTOCOL  Order #:    [id number]                    Reading MD: Addi Jones    Measurements  Intervals                                Axis  Rate:       78                           P:          82  IA:         151                          QRS:        55  QRSD:       79                           T:          31  QT:         384  QTc:        438    Interpretive Statements  Sinus rhythm  Probable left atrial enlargement  Borderline low voltage, extremity leads  Compared to ECG 2021 23:27:18  Sinus tachycardia no longer present  T-wave abnormality no longer present  Electronically Signed On 10- 19:14:38 PDT by Addi Jones           RADIOLOGY  CT-HEAD W/O   Final Result         NO ACUTE ABNORMALITIES ARE NOTED ON CT SCAN OF THE HEAD.               DX-CHEST-PORTABLE (1 VIEW)   Final Result      No acute cardiopulmonary abnormality.               COURSE & MEDICAL DECISION MAKING  Pertinent Labs & Imaging studies reviewed. (See chart for details)  41-year-old female presented emerged department after syncope a few days  ago.  History as above.  Patient overall appears relatively well although quite thin.  I suspect mild malnutrition and additionally some dehydration.  Labs and imaging at this point are reassuring.  TSH remains pending and she does have what I would suspect is a right-sided thyroid nodule.  She has been referred back to outpatient PCP for for ongoing care.  She states that her current PCP does have thyroid ultrasound pending.  She return to the ER with any change or worsening.   The patient will return for worsening symptoms and is stable at the time of discharge. The patient verbalizes understanding and will comply.    FINAL IMPRESSION  1. Syncope, unspecified syncope type            Electronically signed by: Addi Jones M.D., 10/12/2022 5:21 PM

## 2022-10-13 NOTE — ED NOTES
Discharge teaching and paperwork provided regarding syncope and all questions/concerns answered. VSS, assessment stable and PIV removed. Given information regarding home care and reasons to follow up with ED or primary MD.

## 2023-10-30 ENCOUNTER — APPOINTMENT (OUTPATIENT)
Dept: RADIOLOGY | Facility: MEDICAL CENTER | Age: 43
End: 2023-10-30
Attending: EMERGENCY MEDICINE
Payer: COMMERCIAL

## 2023-10-30 ENCOUNTER — HOSPITAL ENCOUNTER (OUTPATIENT)
Facility: MEDICAL CENTER | Age: 43
End: 2023-10-31
Attending: EMERGENCY MEDICINE | Admitting: INTERNAL MEDICINE
Payer: COMMERCIAL

## 2023-10-30 ENCOUNTER — APPOINTMENT (OUTPATIENT)
Dept: CARDIOLOGY | Facility: MEDICAL CENTER | Age: 43
End: 2023-10-30
Attending: INTERNAL MEDICINE
Payer: COMMERCIAL

## 2023-10-30 DIAGNOSIS — R11.0 NAUSEA: ICD-10-CM

## 2023-10-30 DIAGNOSIS — R07.9 ACUTE CHEST PAIN: ICD-10-CM

## 2023-10-30 DIAGNOSIS — R63.4 WEIGHT LOSS: ICD-10-CM

## 2023-10-30 DIAGNOSIS — R55 NEAR SYNCOPE: ICD-10-CM

## 2023-10-30 DIAGNOSIS — R68.81 EARLY SATIETY: ICD-10-CM

## 2023-10-30 PROBLEM — D68.00 VON WILLEBRAND DISEASE (HCC): Status: ACTIVE | Noted: 2023-10-30

## 2023-10-30 LAB
ALBUMIN SERPL BCP-MCNC: 4.4 G/DL (ref 3.2–4.9)
ALBUMIN/GLOB SERPL: 1.8 G/DL
ALP SERPL-CCNC: 56 U/L (ref 30–99)
ALT SERPL-CCNC: 11 U/L (ref 2–50)
AMPHET UR QL SCN: NEGATIVE
ANION GAP SERPL CALC-SCNC: 12 MMOL/L (ref 7–16)
AST SERPL-CCNC: 14 U/L (ref 12–45)
BARBITURATES UR QL SCN: NEGATIVE
BASOPHILS # BLD AUTO: 0.8 % (ref 0–1.8)
BASOPHILS # BLD: 0.05 K/UL (ref 0–0.12)
BENZODIAZ UR QL SCN: NEGATIVE
BILIRUB SERPL-MCNC: 0.7 MG/DL (ref 0.1–1.5)
BUN SERPL-MCNC: 8 MG/DL (ref 8–22)
BZE UR QL SCN: NEGATIVE
CALCIUM ALBUM COR SERPL-MCNC: 8.8 MG/DL (ref 8.5–10.5)
CALCIUM SERPL-MCNC: 9.1 MG/DL (ref 8.5–10.5)
CANNABINOIDS UR QL SCN: POSITIVE
CHLORIDE SERPL-SCNC: 103 MMOL/L (ref 96–112)
CHOLEST SERPL-MCNC: 121 MG/DL (ref 100–199)
CO2 SERPL-SCNC: 22 MMOL/L (ref 20–33)
CREAT SERPL-MCNC: 0.49 MG/DL (ref 0.5–1.4)
D DIMER PPP IA.FEU-MCNC: <0.27 UG/ML (FEU) (ref 0–0.5)
EKG IMPRESSION: NORMAL
EOSINOPHIL # BLD AUTO: 0.03 K/UL (ref 0–0.51)
EOSINOPHIL NFR BLD: 0.5 % (ref 0–6.9)
ERYTHROCYTE [DISTWIDTH] IN BLOOD BY AUTOMATED COUNT: 38.1 FL (ref 35.9–50)
EST. AVERAGE GLUCOSE BLD GHB EST-MCNC: 85 MG/DL
FENTANYL UR QL: NEGATIVE
GFR SERPLBLD CREATININE-BSD FMLA CKD-EPI: 120 ML/MIN/1.73 M 2
GLOBULIN SER CALC-MCNC: 2.5 G/DL (ref 1.9–3.5)
GLUCOSE SERPL-MCNC: 91 MG/DL (ref 65–99)
HBA1C MFR BLD: 4.6 % (ref 4–5.6)
HCG SERPL QL: NEGATIVE
HCT VFR BLD AUTO: 41.4 % (ref 37–47)
HDLC SERPL-MCNC: 38 MG/DL
HGB BLD-MCNC: 14.4 G/DL (ref 12–16)
IMM GRANULOCYTES # BLD AUTO: 0.01 K/UL (ref 0–0.11)
IMM GRANULOCYTES NFR BLD AUTO: 0.2 % (ref 0–0.9)
LDLC SERPL CALC-MCNC: 68 MG/DL
LV EJECT FRACT  99904: 65
LYMPHOCYTES # BLD AUTO: 1.58 K/UL (ref 1–4.8)
LYMPHOCYTES NFR BLD: 25 % (ref 22–41)
MAGNESIUM SERPL-MCNC: 1.9 MG/DL (ref 1.5–2.5)
MCH RBC QN AUTO: 30.1 PG (ref 27–33)
MCHC RBC AUTO-ENTMCNC: 34.8 G/DL (ref 32.2–35.5)
MCV RBC AUTO: 86.6 FL (ref 81.4–97.8)
METHADONE UR QL SCN: NEGATIVE
MONOCYTES # BLD AUTO: 0.5 K/UL (ref 0–0.85)
MONOCYTES NFR BLD AUTO: 7.9 % (ref 0–13.4)
NEUTROPHILS # BLD AUTO: 4.15 K/UL (ref 1.82–7.42)
NEUTROPHILS NFR BLD: 65.6 % (ref 44–72)
NRBC # BLD AUTO: 0 K/UL
NRBC BLD-RTO: 0 /100 WBC (ref 0–0.2)
OPIATES UR QL SCN: NEGATIVE
OXYCODONE UR QL SCN: NEGATIVE
PCP UR QL SCN: NEGATIVE
PHOSPHATE SERPL-MCNC: 3.2 MG/DL (ref 2.5–4.5)
PLATELET # BLD AUTO: 199 K/UL (ref 164–446)
PMV BLD AUTO: 11 FL (ref 9–12.9)
POTASSIUM SERPL-SCNC: 3.7 MMOL/L (ref 3.6–5.5)
PROPOXYPH UR QL SCN: NEGATIVE
PROT SERPL-MCNC: 6.9 G/DL (ref 6–8.2)
RBC # BLD AUTO: 4.78 M/UL (ref 4.2–5.4)
SODIUM SERPL-SCNC: 137 MMOL/L (ref 135–145)
TRIGL SERPL-MCNC: 73 MG/DL (ref 0–149)
TROPONIN T SERPL-MCNC: 6 NG/L (ref 6–19)
TROPONIN T SERPL-MCNC: <6 NG/L (ref 6–19)
TROPONIN T SERPL-MCNC: <6 NG/L (ref 6–19)
WBC # BLD AUTO: 6.3 K/UL (ref 4.8–10.8)

## 2023-10-30 PROCEDURE — G0378 HOSPITAL OBSERVATION PER HR: HCPCS

## 2023-10-30 PROCEDURE — 84703 CHORIONIC GONADOTROPIN ASSAY: CPT

## 2023-10-30 PROCEDURE — 700102 HCHG RX REV CODE 250 W/ 637 OVERRIDE(OP): Performed by: INTERNAL MEDICINE

## 2023-10-30 PROCEDURE — 93306 TTE W/DOPPLER COMPLETE: CPT

## 2023-10-30 PROCEDURE — 83735 ASSAY OF MAGNESIUM: CPT

## 2023-10-30 PROCEDURE — 93010 ELECTROCARDIOGRAM REPORT: CPT | Performed by: INTERNAL MEDICINE

## 2023-10-30 PROCEDURE — 84100 ASSAY OF PHOSPHORUS: CPT

## 2023-10-30 PROCEDURE — 93005 ELECTROCARDIOGRAM TRACING: CPT | Performed by: INTERNAL MEDICINE

## 2023-10-30 PROCEDURE — A9270 NON-COVERED ITEM OR SERVICE: HCPCS | Performed by: NURSE PRACTITIONER

## 2023-10-30 PROCEDURE — 36415 COLL VENOUS BLD VENIPUNCTURE: CPT

## 2023-10-30 PROCEDURE — 93005 ELECTROCARDIOGRAM TRACING: CPT

## 2023-10-30 PROCEDURE — A9270 NON-COVERED ITEM OR SERVICE: HCPCS | Performed by: INTERNAL MEDICINE

## 2023-10-30 PROCEDURE — 93005 ELECTROCARDIOGRAM TRACING: CPT | Performed by: EMERGENCY MEDICINE

## 2023-10-30 PROCEDURE — 71045 X-RAY EXAM CHEST 1 VIEW: CPT

## 2023-10-30 PROCEDURE — 85025 COMPLETE CBC W/AUTO DIFF WBC: CPT

## 2023-10-30 PROCEDURE — 99285 EMERGENCY DEPT VISIT HI MDM: CPT

## 2023-10-30 PROCEDURE — 99222 1ST HOSP IP/OBS MODERATE 55: CPT | Performed by: INTERNAL MEDICINE

## 2023-10-30 PROCEDURE — 80307 DRUG TEST PRSMV CHEM ANLYZR: CPT

## 2023-10-30 PROCEDURE — 80061 LIPID PANEL: CPT

## 2023-10-30 PROCEDURE — 84484 ASSAY OF TROPONIN QUANT: CPT

## 2023-10-30 PROCEDURE — 85379 FIBRIN DEGRADATION QUANT: CPT

## 2023-10-30 PROCEDURE — 700105 HCHG RX REV CODE 258: Performed by: INTERNAL MEDICINE

## 2023-10-30 PROCEDURE — 83036 HEMOGLOBIN GLYCOSYLATED A1C: CPT

## 2023-10-30 PROCEDURE — 93306 TTE W/DOPPLER COMPLETE: CPT | Mod: 26 | Performed by: INTERNAL MEDICINE

## 2023-10-30 PROCEDURE — 700102 HCHG RX REV CODE 250 W/ 637 OVERRIDE(OP): Performed by: NURSE PRACTITIONER

## 2023-10-30 PROCEDURE — 80053 COMPREHEN METABOLIC PANEL: CPT

## 2023-10-30 PROCEDURE — 70450 CT HEAD/BRAIN W/O DYE: CPT

## 2023-10-30 RX ORDER — BISACODYL 10 MG
10 SUPPOSITORY, RECTAL RECTAL
Status: DISCONTINUED | OUTPATIENT
Start: 2023-10-30 | End: 2023-10-31 | Stop reason: HOSPADM

## 2023-10-30 RX ORDER — REGADENOSON 0.08 MG/ML
0.4 INJECTION, SOLUTION INTRAVENOUS
Status: COMPLETED | OUTPATIENT
Start: 2023-10-30 | End: 2023-10-31

## 2023-10-30 RX ORDER — MORPHINE SULFATE 4 MG/ML
1-2 INJECTION INTRAVENOUS
Status: DISCONTINUED | OUTPATIENT
Start: 2023-10-30 | End: 2023-10-31 | Stop reason: HOSPADM

## 2023-10-30 RX ORDER — HYDRALAZINE HYDROCHLORIDE 20 MG/ML
10 INJECTION INTRAMUSCULAR; INTRAVENOUS EVERY 4 HOURS PRN
Status: DISCONTINUED | OUTPATIENT
Start: 2023-10-30 | End: 2023-10-31 | Stop reason: HOSPADM

## 2023-10-30 RX ORDER — PROMETHAZINE HYDROCHLORIDE 25 MG/1
12.5-25 SUPPOSITORY RECTAL EVERY 4 HOURS PRN
Status: DISCONTINUED | OUTPATIENT
Start: 2023-10-30 | End: 2023-10-31 | Stop reason: HOSPADM

## 2023-10-30 RX ORDER — POLYETHYLENE GLYCOL 3350 17 G/17G
1 POWDER, FOR SOLUTION ORAL
Status: DISCONTINUED | OUTPATIENT
Start: 2023-10-30 | End: 2023-10-31 | Stop reason: HOSPADM

## 2023-10-30 RX ORDER — ALUMINA, MAGNESIA, AND SIMETHICONE 2400; 2400; 240 MG/30ML; MG/30ML; MG/30ML
30 SUSPENSION ORAL EVERY 4 HOURS PRN
Status: DISCONTINUED | OUTPATIENT
Start: 2023-10-30 | End: 2023-10-31 | Stop reason: HOSPADM

## 2023-10-30 RX ORDER — ENOXAPARIN SODIUM 100 MG/ML
40 INJECTION SUBCUTANEOUS DAILY
Status: DISCONTINUED | OUTPATIENT
Start: 2023-10-30 | End: 2023-10-30

## 2023-10-30 RX ORDER — AMINOPHYLLINE 25 MG/ML
100 INJECTION, SOLUTION INTRAVENOUS
Status: DISCONTINUED | OUTPATIENT
Start: 2023-10-30 | End: 2023-10-31 | Stop reason: HOSPADM

## 2023-10-30 RX ORDER — PROCHLORPERAZINE EDISYLATE 5 MG/ML
5-10 INJECTION INTRAMUSCULAR; INTRAVENOUS EVERY 4 HOURS PRN
Status: DISCONTINUED | OUTPATIENT
Start: 2023-10-30 | End: 2023-10-31 | Stop reason: HOSPADM

## 2023-10-30 RX ORDER — SODIUM CHLORIDE, SODIUM LACTATE, POTASSIUM CHLORIDE, CALCIUM CHLORIDE 600; 310; 30; 20 MG/100ML; MG/100ML; MG/100ML; MG/100ML
INJECTION, SOLUTION INTRAVENOUS CONTINUOUS
Status: DISCONTINUED | OUTPATIENT
Start: 2023-10-30 | End: 2023-10-31 | Stop reason: HOSPADM

## 2023-10-30 RX ORDER — NITROGLYCERIN 0.4 MG/1
0.4 TABLET SUBLINGUAL
Status: DISCONTINUED | OUTPATIENT
Start: 2023-10-30 | End: 2023-10-31 | Stop reason: HOSPADM

## 2023-10-30 RX ORDER — ACETAMINOPHEN 325 MG/1
650 TABLET ORAL EVERY 6 HOURS PRN
Status: DISCONTINUED | OUTPATIENT
Start: 2023-10-30 | End: 2023-10-31 | Stop reason: HOSPADM

## 2023-10-30 RX ORDER — CHOLECALCIFEROL (VITAMIN D3) 125 MCG
5 CAPSULE ORAL NIGHTLY PRN
Status: DISCONTINUED | OUTPATIENT
Start: 2023-10-30 | End: 2023-10-31 | Stop reason: HOSPADM

## 2023-10-30 RX ORDER — PROMETHAZINE HYDROCHLORIDE 25 MG/1
12.5-25 TABLET ORAL EVERY 4 HOURS PRN
Status: DISCONTINUED | OUTPATIENT
Start: 2023-10-30 | End: 2023-10-31 | Stop reason: HOSPADM

## 2023-10-30 RX ORDER — AMOXICILLIN 250 MG
2 CAPSULE ORAL 2 TIMES DAILY
Status: DISCONTINUED | OUTPATIENT
Start: 2023-10-30 | End: 2023-10-31 | Stop reason: HOSPADM

## 2023-10-30 RX ORDER — ONDANSETRON 4 MG/1
4 TABLET, ORALLY DISINTEGRATING ORAL EVERY 6 HOURS PRN
COMMUNITY

## 2023-10-30 RX ORDER — ONDANSETRON 2 MG/ML
4 INJECTION INTRAMUSCULAR; INTRAVENOUS EVERY 4 HOURS PRN
Status: DISCONTINUED | OUTPATIENT
Start: 2023-10-30 | End: 2023-10-31 | Stop reason: HOSPADM

## 2023-10-30 RX ORDER — ASPIRIN 81 MG/1
81 TABLET ORAL DAILY
Status: DISCONTINUED | OUTPATIENT
Start: 2023-10-30 | End: 2023-10-30

## 2023-10-30 RX ORDER — ONDANSETRON 4 MG/1
4 TABLET, ORALLY DISINTEGRATING ORAL EVERY 4 HOURS PRN
Status: DISCONTINUED | OUTPATIENT
Start: 2023-10-30 | End: 2023-10-31 | Stop reason: HOSPADM

## 2023-10-30 RX ADMIN — SODIUM CHLORIDE, POTASSIUM CHLORIDE, SODIUM LACTATE AND CALCIUM CHLORIDE: 600; 310; 30; 20 INJECTION, SOLUTION INTRAVENOUS at 15:23

## 2023-10-30 RX ADMIN — ACETAMINOPHEN 650 MG: 325 TABLET, FILM COATED ORAL at 20:11

## 2023-10-30 RX ADMIN — Medication 5 MG: at 21:14

## 2023-10-30 ASSESSMENT — HEART SCORE
HISTORY: SLIGHTLY SUSPICIOUS
RISK FACTORS: NO KNOWN RISK FACTORS
TROPONIN: LESS THAN OR EQUAL TO NORMAL LIMIT
ECG: NORMAL

## 2023-10-30 ASSESSMENT — LIFESTYLE VARIABLES
TOTAL SCORE: 0
TOTAL SCORE: 0
HOW MANY TIMES IN THE PAST YEAR HAVE YOU HAD 5 OR MORE DRINKS IN A DAY: 0
HAVE YOU EVER FELT YOU SHOULD CUT DOWN ON YOUR DRINKING: NO
EVER HAD A DRINK FIRST THING IN THE MORNING TO STEADY YOUR NERVES TO GET RID OF A HANGOVER: NO
TOTAL SCORE: 0
EVER FELT BAD OR GUILTY ABOUT YOUR DRINKING: NO
ALCOHOL_USE: NO
ON A TYPICAL DAY WHEN YOU DRINK ALCOHOL HOW MANY DRINKS DO YOU HAVE: 0
AVERAGE NUMBER OF DAYS PER WEEK YOU HAVE A DRINK CONTAINING ALCOHOL: 0
DOES PATIENT WANT TO STOP DRINKING: NO
HAVE PEOPLE ANNOYED YOU BY CRITICIZING YOUR DRINKING: NO
CONSUMPTION TOTAL: NEGATIVE

## 2023-10-30 ASSESSMENT — PAIN DESCRIPTION - PAIN TYPE
TYPE: ACUTE PAIN
TYPE: ACUTE PAIN

## 2023-10-30 ASSESSMENT — PATIENT HEALTH QUESTIONNAIRE - PHQ9
1. LITTLE INTEREST OR PLEASURE IN DOING THINGS: NOT AT ALL
SUM OF ALL RESPONSES TO PHQ9 QUESTIONS 1 AND 2: 0
2. FEELING DOWN, DEPRESSED, IRRITABLE, OR HOPELESS: NOT AT ALL
2. FEELING DOWN, DEPRESSED, IRRITABLE, OR HOPELESS: NOT AT ALL
1. LITTLE INTEREST OR PLEASURE IN DOING THINGS: NOT AT ALL
SUM OF ALL RESPONSES TO PHQ9 QUESTIONS 1 AND 2: 0

## 2023-10-30 ASSESSMENT — FIBROSIS 4 INDEX
FIB4 SCORE: 1.24
FIB4 SCORE: 0.89

## 2023-10-30 NOTE — ASSESSMENT & PLAN NOTE
The ASCVD Risk score (Tre GONSALEZ, et al., 2019) failed to calculate for the following reasons:    Cannot find a previous HDL lab    Cannot find a previous total cholesterol lab    -Unclear etiology.  Seem to be related to episodes of low heart rate.  Reasonable to pursue further cardiac work-up.  -Admitted to CDU/telemetry.  -Will trend troponins and obtain an echocardiogram.  If troponins negative we will proceed with nuclear cardiac stress testing.  -Monitor on telemetry to evaluate for episodes of bradycardia, particularly symptomatic ones.  -Hold off on empiric aspirin for now given her von Willebrand disease.  Check lipid profile and hemoglobin A1c for further risk stratification.   -Start as needed sublingual nitroglycerin, and morphine for pain recurrence.   -For completion, I will obtain a d-dimer, and if elevated will proceed with ruling out PE.  Check urine drug screen.  -She looks dry.  Will hydrate with IV LR at 125 cc/h.

## 2023-10-30 NOTE — ED NOTES
Ambulated patient around the ED pod with pulse ox, HR maintained 60-80s with ambulation. Pt reported mild dizziness. Pt also states that she has pain intermittently when her HR drops into the 40s.

## 2023-10-30 NOTE — ED NOTES
"Pt ambulated to room with a steady gait, changed into gown, placed on monitoring, chart up for ERP. Labs drawn while pt in lobby. Pt states her HR on her watch has been \"anywhere from the 30s-150s\".   "

## 2023-10-30 NOTE — PROGRESS NOTES
Admit profile and assessment completed.  Pt A&Ox4.  Pt reports 6/10 abdominal pain, declines interventions at this time. Pt denies any CP, SOB, nausea, palpitations at this time.  Sinus sherrill noted on telemetry monitor, pt asymptomatic. Pt updated on POC: serial troponins, echocardiogram, AM lab, stress test.  Communication board updated.  Bed in locked, lowest position.  Call light and belongings within reach.  Needs met.

## 2023-10-30 NOTE — ED TRIAGE NOTES
Chief Complaint   Patient presents with    Sent by MD Linn found her heart rate to be in atrial fibrillation by her watch over the weekend.  She was told to com to ER after reporting that to her doctor today.  First confirmed in atrial fib 3 weeks ago.  PT is not taking taking blood thinners, h/o Von Willebrand disease.     Chest Pain     Intermittent over weekend     EKG completed, CP labs ordered.

## 2023-10-30 NOTE — CARE PLAN
The patient is Stable - Low risk of patient condition declining or worsening    Shift Goals  Clinical Goals: stress test, echocardiogram, monitor labs/tele  Patient Goals: rest  Family Goals: JAVI    Progress made toward(s) clinical / shift goals:    Problem: Knowledge Deficit - Standard  Goal: Patient and family/care givers will demonstrate understanding of plan of care, disease process/condition, diagnostic tests and medications  Outcome: Progressing     Problem: Pain - Standard  Goal: Alleviation of pain or a reduction in pain to the patient’s comfort goal  Outcome: Progressing

## 2023-10-30 NOTE — ED NOTES
Orthostatic vitals obtained as documented below.    Laying down 5 minutes: HR 68 /66  Standing 1 minute: HR 87 /75  Standing 3 minutes: HR 79 /74

## 2023-10-30 NOTE — ED PROVIDER NOTES
ED Provider Note    CHIEF COMPLAINT  Chief Complaint   Patient presents with    Sent by MD Linn found her heart rate to be in atrial fibrillation by her watch over the weekend.  She was told to com to ER after reporting that to her doctor today.  First confirmed in atrial fib 3 weeks ago.  PT is not taking taking blood thinners, h/o Von Willebrand disease.     Chest Pain     Intermittent over weekend       EXTERNAL RECORDS REVIEWED  Outpatient labs & studies TSH normal on 10/12    HPI/ROS  LIMITATION TO HISTORY   Select: : None    OUTSIDE HISTORIAN(S):  Significant other male SO at bedside.  Reports patient complains of hand numbness with sleeping and that her equilibrium is off.    Leola He is a 42 y.o. female with history of von Willebrand's disease who presents for evaluation of various concerns including dizziness, syncope, and chest pain.    Patient states she is scheduled to see cardiology in December for chest pain, syncope, palpitations.  She has been having these symptoms for at least 1 year.  Syncope work-up at that time, including a Holter monitor, revealed no etiology or arrhythmia.  She was told it might be stress related.  Her symptoms have continued but she now experiences additional symptoms including dizziness, equilibrium being off, memory loss, tingling in her extremities, early satiety, constant nausea, and a 40 pound weight loss that is unintentional.    Patient describes her chest pain as located in the left upper chest area, intermittent but daily, lasting for 10 minutes at a time without aggravating or alleviating factors.  She does have associated shortness of breath and nausea with these.    She takes Tylenol for neck pain and associated headaches in the occipital region.  She takes Zofran on a daily basis for nausea.    Patient has noted heart rates from  on her Apple Watch which tells her she is in atrial fibrillation at times.    Patient denies fever, chills,  diaphoresis, calf pain, leg swelling, melena, hematochezia, vomiting.    PAST MEDICAL HISTORY   has a past medical history of Asthma, Bronchitis, Chest tightness, Cough, Daytime sleepiness, Depression, Dizziness, Earache, Fatigue, Frequent urination, Hoarseness, persistent, Lumps on the skin, Morning headache, Nasal drainage, Painful breathing, Rhinitis, Shortness of breath, Sore muscles, Sore throat, chronic, Toothache, Weakness, and Wears glasses.    SURGICAL HISTORY   has a past surgical history that includes primary c section and hysterectomy laparoscopy.    FAMILY HISTORY  Cancer (brain, breast, kidney, bone)    SOCIAL HISTORY  Social History     Tobacco Use    Smoking status: Never    Smokeless tobacco: Never   Vaping Use    Vaping Use: Never used   Substance and Sexual Activity    Alcohol use: Yes    Drug use: Yes     Comment: Gummies    Sexual activity: Not on file       CURRENT MEDICATIONS  Home Medications       Reviewed by Jose Miguel Wall (Pharmacy Tech) on 10/30/23 at 1351  Med List Status: Complete     Medication Last Dose Status   ondansetron (ZOFRAN ODT) 4 MG TABLET DISPERSIBLE 2d ago Active                    ALLERGIES  Allergies   Allergen Reactions    Pcn [Penicillins]      hives    Diphenhydramine      Manic attack       PHYSICAL EXAM  VITAL SIGNS: /78   Pulse 60   Temp 36.7 °C (98.1 °F) (Temporal)   Resp 19   Wt 44.8 kg (98 lb 12.3 oz)   SpO2 98%   BMI 15.47 kg/m²    General:  WD thin female, nontoxic appearing in NAD; A+Ox3; V/S as above  Skin: warm and dry; good color; no rash  HEENT: NCAT; EOMs intact; PERRL; no scleral icterus; dry lips  Neck: FROM; no LAD, no thyromegaly, no stridor  Cardiovascular: Regular heart rate and rhythm.  No murmurs, rubs, or gallops; pulses 2+ bilaterally radially  Lungs: No respiratory distress or tachypnea; Clear to auscultation with good air movement bilaterally.  No wheezes, rhonchi, or rales.   Abdomen: BS present; soft; NTND; no rebound,  guarding, or rigidity.  No organomegaly or pulsatile mass; no CVAT   Extremities: ALVAREZ x 4; no e/o trauma; no pedal edema; neg Marlon's  Neurologic: CNs III-XII grossly intact; speech clear  Psychiatric: Appropriate affect, normal mood      DIAGNOSTIC STUDIES / PROCEDURES  EKG  I have independently interpreted this EKG which shows no arrhythmia and no ST changes.  No QT prolongation or delta wave.      LABS  Results for orders placed or performed during the hospital encounter of 10/30/23   CBC with Differential   Result Value Ref Range    WBC 6.3 4.8 - 10.8 K/uL    RBC 4.78 4.20 - 5.40 M/uL    Hemoglobin 14.4 12.0 - 16.0 g/dL    Hematocrit 41.4 37.0 - 47.0 %    MCV 86.6 81.4 - 97.8 fL    MCH 30.1 27.0 - 33.0 pg    MCHC 34.8 32.2 - 35.5 g/dL    RDW 38.1 35.9 - 50.0 fL    Platelet Count 199 164 - 446 K/uL    MPV 11.0 9.0 - 12.9 fL    Neutrophils-Polys 65.60 44.00 - 72.00 %    Lymphocytes 25.00 22.00 - 41.00 %    Monocytes 7.90 0.00 - 13.40 %    Eosinophils 0.50 0.00 - 6.90 %    Basophils 0.80 0.00 - 1.80 %    Immature Granulocytes 0.20 0.00 - 0.90 %    Nucleated RBC 0.00 0.00 - 0.20 /100 WBC    Neutrophils (Absolute) 4.15 1.82 - 7.42 K/uL    Lymphs (Absolute) 1.58 1.00 - 4.80 K/uL    Monos (Absolute) 0.50 0.00 - 0.85 K/uL    Eos (Absolute) 0.03 0.00 - 0.51 K/uL    Baso (Absolute) 0.05 0.00 - 0.12 K/uL    Immature Granulocytes (abs) 0.01 0.00 - 0.11 K/uL    NRBC (Absolute) 0.00 K/uL   Complete Metabolic Panel (CMP)   Result Value Ref Range    Sodium 137 135 - 145 mmol/L    Potassium 3.7 3.6 - 5.5 mmol/L    Chloride 103 96 - 112 mmol/L    Co2 22 20 - 33 mmol/L    Anion Gap 12.0 7.0 - 16.0    Glucose 91 65 - 99 mg/dL    Bun 8 8 - 22 mg/dL    Creatinine 0.49 (L) 0.50 - 1.40 mg/dL    Calcium 9.1 8.5 - 10.5 mg/dL    Correct Calcium 8.8 8.5 - 10.5 mg/dL    AST(SGOT) 14 12 - 45 U/L    ALT(SGPT) 11 2 - 50 U/L    Alkaline Phosphatase 56 30 - 99 U/L    Total Bilirubin 0.7 0.1 - 1.5 mg/dL    Albumin 4.4 3.2 - 4.9 g/dL    Total  Protein 6.9 6.0 - 8.2 g/dL    Globulin 2.5 1.9 - 3.5 g/dL    A-G Ratio 1.8 g/dL   Troponins NOW   Result Value Ref Range    Troponin T <6 6 - 19 ng/L   Troponins in two (2) hours   Result Value Ref Range    Troponin T <6 6 - 19 ng/L   BETA-HCG QUALITATIVE SERUM   Result Value Ref Range    Beta-Hcg Qualitative Serum Negative Negative   ESTIMATED GFR   Result Value Ref Range    GFR (CKD-EPI) 120 >60 mL/min/1.73 m 2   MAGNESIUM   Result Value Ref Range    Magnesium 1.9 1.5 - 2.5 mg/dL   PHOSPHORUS   Result Value Ref Range    Phosphorus 3.2 2.5 - 4.5 mg/dL   Lipid Profile   Result Value Ref Range    Cholesterol,Tot 121 100 - 199 mg/dL    Triglycerides 73 0 - 149 mg/dL    HDL 38 (A) >=40 mg/dL    LDL 68 <100 mg/dL   HEMOGLOBIN A1C   Result Value Ref Range    Glycohemoglobin 4.6 4.0 - 5.6 %    Est Avg Glucose 85 mg/dL   D-Dimer   Result Value Ref Range    D-Dimer <0.27 0.00 - 0.50 ug/mL (FEU)   EKG   Result Value Ref Range    Report       [facility] Emergency Dept.    Test Date:  2023-10-30  Pt Name:    ANA COBIAN             Department: ER  MRN:        [id number]                      Room:  Gender:     Female                       Technician: [id number]  :        1980                   Requested By:ER TRIAGE PROTOCOL  Order #:    [id number]                    Reading MD: ALESIA DE LA FUENTE MD    Measurements  Intervals                                Axis  Rate:       83                           P:          78  CO:         158                          QRS:        35  QRSD:       76                           T:          40  QT:         372  QTc:        437    Interpretive Statements  Sinus rhythm  Probable left atrial enlargement  Low voltage, extremity leads  Compared to ECG 10/12/2022 16:55:13  No significant changes  Electronically Signed On 10- 11:21:59 PDT by ALESIA DE LA FUENTE MD           RADIOLOGY  I have independently interpreted the diagnostic imaging associated with this visit and  am waiting the final reading from the radiologist.   My preliminary interpretation is as follows:   Chest x-ray shows no focal consolidation or pneumothorax    CT head demonstrates no intracranial hemorrhage    Radiologist interpretation:   CT-HEAD W/O   Final Result      Head CT without contrast within normal limits. No evidence of acute cerebral infarction, hemorrhage or mass lesion.         DX-CHEST-PORTABLE (1 VIEW)   Final Result      No acute cardiopulmonary disease.      EC-ECHOCARDIOGRAM COMPLETE W/O CONT    (Results Pending)   NM-CARDIAC STRESS TEST    (Results Pending)         COURSE & MEDICAL DECISION MAKING    ED Observation Status? Yes; I am placing the patient in to an observation status due to a diagnostic uncertainty as well as therapeutic intensity. Patient placed in observation status at 11:21 AM, 10/30/2023.     Observation plan is as follows: Labs, imaging, EKG, orthostatics    Upon Reevaluation, the patient's condition has: not improved; and will be escalated to hospitalization.    Patient discharged from ED Observation status at 1400 (Time) 10/30/23 (Date).     INITIAL ASSESSMENT, COURSE AND PLAN  Care Narrative: This is a 42-year-old who presents to the ER for chest pain, syncope, palpitations for some time now accompanied by memory loss, dizziness, tingling, early satiety, nausea, and an unintentional 40 pound weight loss over the last 4 months.    Concern for arrhythmia, anemia, thyroid disorder, dehydration, electrolyte abnormality, malignancy, demyelinating process, intracranial process.    CT head negative for acute pathology.    Chest x-ray demonstrates no space-occupying lesions or cardiomegaly.    Orthostatics showed an increase in the heart rate from 68-87 with standing but no drops in blood pressure.    Labs demonstrate a negative hCG, no anemia, no electrolyte abnormality.    Patient's heart rate in the 40s here.    I reevaluated the patient and advised her of the results I have  "thus far.  She reports \"lumps\" in her abdominal region.  She reports having had a CT abdomen in the last couple of months and states the report stated that her ovaries were unable to be visualized but she had a partial hysterectomy and still has her ovaries.  I encouraged her to see the GI for possible endoscopy given weight loss and early satiety with nausea.    I discussed the case with Dr. Steele from the hospital service who agrees to hospitalize the patient for further cardiac work-up with echo, cardiac monitoring, and possible stress testing.    DISPOSITION AND DISCUSSIONS  I have discussed management of the patient with the following physicians and BLAYNE's:    Cedric    Decision tools and prescription drugs considered including, but not limited to: HEART Score 0 .    FINAL DIAGNOSIS  1. Acute chest pain    2. Near syncope    3. Nausea    4. Early satiety    5. Weight loss      Electronically signed by: Alice Gibson M.D., 10/30/2023 11:20 AM      "

## 2023-10-30 NOTE — H&P
Hospital Medicine History & Physical Note    Date of Service  10/30/2023    Primary Care Physician  Pcp Pt States None    Consultants  None    Code Status  Full Code    Chief Complaint  Chief Complaint   Patient presents with    Sent by MD Linn found her heart rate to be in atrial fibrillation by her watch over the weekend.  She was told to com to ER after reporting that to her doctor today.  First confirmed in atrial fib 3 weeks ago.  PT is not taking taking blood thinners, h/o Von Willebrand disease.     Chest Pain     Intermittent over weekend       History of Presenting Illness  Leola He is a 42 y.o. female with von Willebrand disease, who presented 10/30/2023 with sensation of presyncope, along with intermittent left-sided chest pain which she described as tightness and not related to movement or exertion but related to episodes of low heart rate as detected by her Apple Watch.  She shared that she has been having these issues in the past 1 year, for which she had a Holter monitor which did not show any arrhythmia, and was eventually  attributed to stress.  However in the past 1 week, she has been noticing that her heart rate was running from  on her Apple Watch, and has been having episodes of dizziness on top of her other symptoms.  At times, her Apple Watch have told her that she had atrial fibrillation.  She denies any fevers, chills, abdominal pain, bowel movement changes.  She added that she is had masses on her neck and abdomen for the past 3 to 5 years which is being followed up as outpatient although these have not been biopsied yet due to her von Willebrand disease.  She has had a 40 pound unintentional weight loss in the past several months.    ED course:  On evaluation, vital signs were stable, however her heart rate was noted to be fluctuating from as low as 50 to as high as 90.  Initial blood work-up showed no leukocytosis, with normal electrolytes and renal function, normal  liver function test, and negative troponin.  TSH was normal.  Head CT showed nothing acute.  Chest x-ray (personally reviewed) did not show infiltrates or consolidation.  EKG showed sinus rhythm with no dynamic ischemic changes (my read).  Orthostatic vital signs were normal.  Patient was subsequently admitted to the hospitalist service for further work-up    I personally reviewed all lab results mentioned above. Prior medical records from this institution and outside facilities were independently reviewed as noted. I also personally reviewed all ER physician and consultant recommendations and plans as documented above. History was independently obtained by myself. I discussed the plan of care with patient, family, bedside RN, charge RN, and ERP .    Review of Systems  ROS    Pertinent positives/negatives as mentioned above.     A complete review of systems was personally done by me. All other systems were negative.       Past Medical History   has a past medical history of Asthma, Bronchitis, Chest tightness, Cough, Daytime sleepiness, Depression, Dizziness, Earache, Fatigue, Frequent urination, Hoarseness, persistent, Lumps on the skin, Morning headache, Nasal drainage, Painful breathing, Rhinitis, Shortness of breath, Sore muscles, Sore throat, chronic, Toothache, Weakness, and Wears glasses.    Surgical History   has a past surgical history that includes primary c section and hysterectomy laparoscopy.     Family History  Family history reviewed with patient. There is no family history that is pertinent to the chief complaint.     Social History   reports that she has never smoked. She has never used smokeless tobacco. She reports current alcohol use. She reports current drug use.    Allergies  Allergies   Allergen Reactions    Pcn [Penicillins]      hives    Diphenhydramine      Manic attack       Medications  Prior to Admission Medications   Prescriptions Last Dose Informant Patient Reported? Taking?    ondansetron (ZOFRAN ODT) 4 MG TABLET DISPERSIBLE 2d ago  Yes Yes   Sig: Take 4 mg by mouth every 6 hours as needed for Nausea/Vomiting.      Facility-Administered Medications: None       Physical Exam  Temp:  [36.7 °C (98.1 °F)-36.9 °C (98.5 °F)] 36.7 °C (98.1 °F)  Pulse:  [50-90] 60  Resp:  [15-20] 19  BP: (102-128)/(69-85) 128/78  SpO2:  [96 %-98 %] 98 %  Blood Pressure: 109/74   Temperature: 36.7 °C (98.1 °F)   Pulse: (!) 50   Respiration: 19   Pulse Oximetry: 96 %       Physical Exam  Vitals reviewed.   Constitutional:       General: She is not in acute distress.     Appearance: Normal appearance. She is not ill-appearing or diaphoretic.      Comments: Cachectic. Body mass index is 15.47 kg/m².   HENT:      Head: Normocephalic and atraumatic.      Right Ear: External ear normal.      Left Ear: External ear normal.      Mouth/Throat:      Mouth: Mucous membranes are moist.      Pharynx: No oropharyngeal exudate or posterior oropharyngeal erythema.   Eyes:      General: No scleral icterus.     Extraocular Movements: Extraocular movements intact.      Conjunctiva/sclera: Conjunctivae normal.      Pupils: Pupils are equal, round, and reactive to light.   Cardiovascular:      Rate and Rhythm: Normal rate and regular rhythm.      Heart sounds: Normal heart sounds. No murmur heard.  Pulmonary:      Effort: Pulmonary effort is normal. No respiratory distress.      Breath sounds: Normal breath sounds. No stridor. No wheezing, rhonchi or rales.   Chest:      Chest wall: No tenderness.   Abdominal:      General: Bowel sounds are normal. There is no distension.      Palpations: Abdomen is soft. There is no mass.      Tenderness: There is no abdominal tenderness. There is no guarding or rebound.   Musculoskeletal:         General: No swelling. Normal range of motion.      Cervical back: Normal range of motion and neck supple. No rigidity. No muscular tenderness.      Right lower leg: No edema.      Left lower leg: No  "edema.   Lymphadenopathy:      Cervical: No cervical adenopathy.   Skin:     General: Skin is warm and dry.      Coloration: Skin is not jaundiced.      Findings: No rash.   Neurological:      General: No focal deficit present.      Mental Status: She is alert and oriented to person, place, and time. Mental status is at baseline.      Cranial Nerves: No cranial nerve deficit.   Psychiatric:         Mood and Affect: Mood normal.         Behavior: Behavior normal.         Thought Content: Thought content normal.         Judgment: Judgment normal.         Laboratory:  Recent Labs     10/30/23  1034   WBC 6.3   RBC 4.78   HEMOGLOBIN 14.4   HEMATOCRIT 41.4   MCV 86.6   MCH 30.1   MCHC 34.8   RDW 38.1   PLATELETCT 199   MPV 11.0     Recent Labs     10/30/23  1034   SODIUM 137   POTASSIUM 3.7   CHLORIDE 103   CO2 22   GLUCOSE 91   BUN 8   CREATININE 0.49*   CALCIUM 9.1     Recent Labs     10/30/23  1034   ALTSGPT 11   ASTSGOT 14   ALKPHOSPHAT 56   TBILIRUBIN 0.7   GLUCOSE 91         No results for input(s): \"NTPROBNP\" in the last 72 hours.      Recent Labs     10/30/23  1034   TROPONINT <6       Imaging:  CT-HEAD W/O   Final Result      Head CT without contrast within normal limits. No evidence of acute cerebral infarction, hemorrhage or mass lesion.         DX-CHEST-PORTABLE (1 VIEW)   Final Result      No acute cardiopulmonary disease.      EC-ECHOCARDIOGRAM COMPLETE W/O CONT    (Results Pending)   NM-CARDIAC STRESS TEST    (Results Pending)         Imaging studies and EKG results were independently reviewed as above.      Assessment/Plan:  Justification for Admission Status  I anticipate this patient is appropriate for observation status at this time because presyncope and chest pains requiring further cardiac work-up.    Patient will need a Telemetry bed on MEDICAL service .  The need is secondary to episodes of bradycardia.    * Chest pain, presyncope- (present on admission)  Assessment & Plan  The ASCVD Risk score " (Tre GONSALEZ, et al., 2019) failed to calculate for the following reasons:    Cannot find a previous HDL lab    Cannot find a previous total cholesterol lab    -Unclear etiology.  Seem to be related to episodes of low heart rate.  Reasonable to pursue further cardiac work-up.  -Admitted to CDU/telemetry.  -Will trend troponins and obtain an echocardiogram.  If troponins negative we will proceed with nuclear cardiac stress testing.  -Monitor on telemetry to evaluate for episodes of bradycardia, particularly symptomatic ones.  -Hold off on empiric aspirin for now given her von Willebrand disease.  Check lipid profile and hemoglobin A1c for further risk stratification.   -Start as needed sublingual nitroglycerin, and morphine for pain recurrence.   -For completion, I will obtain a d-dimer, and if elevated will proceed with ruling out PE.  Check urine drug screen.  -She looks dry.  Will hydrate with IV LR at 125 cc/h.    Von Willebrand disease (HCC)- (present on admission)  Assessment & Plan  - Currently no active bleeding.  Monitor.        VTE prophylaxis: SCDs/TEDs

## 2023-10-31 ENCOUNTER — APPOINTMENT (OUTPATIENT)
Dept: RADIOLOGY | Facility: MEDICAL CENTER | Age: 43
End: 2023-10-31
Attending: INTERNAL MEDICINE
Payer: COMMERCIAL

## 2023-10-31 VITALS
RESPIRATION RATE: 18 BRPM | OXYGEN SATURATION: 98 % | DIASTOLIC BLOOD PRESSURE: 82 MMHG | SYSTOLIC BLOOD PRESSURE: 125 MMHG | HEART RATE: 55 BPM | HEIGHT: 67 IN | TEMPERATURE: 98.7 F | BODY MASS INDEX: 15.54 KG/M2 | WEIGHT: 98.99 LBS

## 2023-10-31 PROBLEM — R07.9 CHEST PAIN: Status: RESOLVED | Noted: 2023-10-30 | Resolved: 2023-10-31

## 2023-10-31 LAB
ANION GAP SERPL CALC-SCNC: 10 MMOL/L (ref 7–16)
BASOPHILS # BLD AUTO: 1 % (ref 0–1.8)
BASOPHILS # BLD: 0.06 K/UL (ref 0–0.12)
BUN SERPL-MCNC: 9 MG/DL (ref 8–22)
CALCIUM SERPL-MCNC: 8.5 MG/DL (ref 8.5–10.5)
CHLORIDE SERPL-SCNC: 107 MMOL/L (ref 96–112)
CO2 SERPL-SCNC: 23 MMOL/L (ref 20–33)
CREAT SERPL-MCNC: 0.54 MG/DL (ref 0.5–1.4)
EOSINOPHIL # BLD AUTO: 0.09 K/UL (ref 0–0.51)
EOSINOPHIL NFR BLD: 1.5 % (ref 0–6.9)
ERYTHROCYTE [DISTWIDTH] IN BLOOD BY AUTOMATED COUNT: 37.7 FL (ref 35.9–50)
GFR SERPLBLD CREATININE-BSD FMLA CKD-EPI: 117 ML/MIN/1.73 M 2
GLUCOSE SERPL-MCNC: 80 MG/DL (ref 65–99)
HCT VFR BLD AUTO: 39.3 % (ref 37–47)
HGB BLD-MCNC: 13.6 G/DL (ref 12–16)
IMM GRANULOCYTES # BLD AUTO: 0.02 K/UL (ref 0–0.11)
IMM GRANULOCYTES NFR BLD AUTO: 0.3 % (ref 0–0.9)
LYMPHOCYTES # BLD AUTO: 2.41 K/UL (ref 1–4.8)
LYMPHOCYTES NFR BLD: 39.7 % (ref 22–41)
MCH RBC QN AUTO: 29.8 PG (ref 27–33)
MCHC RBC AUTO-ENTMCNC: 34.6 G/DL (ref 32.2–35.5)
MCV RBC AUTO: 86 FL (ref 81.4–97.8)
MONOCYTES # BLD AUTO: 0.54 K/UL (ref 0–0.85)
MONOCYTES NFR BLD AUTO: 8.9 % (ref 0–13.4)
NEUTROPHILS # BLD AUTO: 2.95 K/UL (ref 1.82–7.42)
NEUTROPHILS NFR BLD: 48.6 % (ref 44–72)
NRBC # BLD AUTO: 0 K/UL
NRBC BLD-RTO: 0 /100 WBC (ref 0–0.2)
PLATELET # BLD AUTO: 178 K/UL (ref 164–446)
PMV BLD AUTO: 11.3 FL (ref 9–12.9)
POTASSIUM SERPL-SCNC: 3.6 MMOL/L (ref 3.6–5.5)
RBC # BLD AUTO: 4.57 M/UL (ref 4.2–5.4)
SODIUM SERPL-SCNC: 140 MMOL/L (ref 135–145)
WBC # BLD AUTO: 6.1 K/UL (ref 4.8–10.8)

## 2023-10-31 PROCEDURE — 78452 HT MUSCLE IMAGE SPECT MULT: CPT

## 2023-10-31 PROCEDURE — 99239 HOSP IP/OBS DSCHRG MGMT >30: CPT | Performed by: INTERNAL MEDICINE

## 2023-10-31 PROCEDURE — 80048 BASIC METABOLIC PNL TOTAL CA: CPT

## 2023-10-31 PROCEDURE — 700111 HCHG RX REV CODE 636 W/ 250 OVERRIDE (IP)

## 2023-10-31 PROCEDURE — 83497 ASSAY OF 5-HIAA: CPT

## 2023-10-31 PROCEDURE — 85025 COMPLETE CBC W/AUTO DIFF WBC: CPT

## 2023-10-31 PROCEDURE — G0378 HOSPITAL OBSERVATION PER HR: HCPCS

## 2023-10-31 PROCEDURE — 700105 HCHG RX REV CODE 258: Performed by: INTERNAL MEDICINE

## 2023-10-31 PROCEDURE — 83835 ASSAY OF METANEPHRINES: CPT

## 2023-10-31 RX ORDER — REGADENOSON 0.08 MG/ML
INJECTION, SOLUTION INTRAVENOUS
Status: COMPLETED
Start: 2023-10-31 | End: 2023-10-31

## 2023-10-31 RX ADMIN — REGADENOSON 0.4 MG: 0.08 INJECTION, SOLUTION INTRAVENOUS at 10:08

## 2023-10-31 RX ADMIN — SODIUM CHLORIDE, POTASSIUM CHLORIDE, SODIUM LACTATE AND CALCIUM CHLORIDE: 600; 310; 30; 20 INJECTION, SOLUTION INTRAVENOUS at 07:14

## 2023-10-31 ASSESSMENT — PAIN DESCRIPTION - PAIN TYPE
TYPE: ACUTE PAIN
TYPE: ACUTE PAIN

## 2023-10-31 NOTE — PROGRESS NOTES
Monitor summary: SR/SB 51-77, RI .15, QRS .09, QT .42, no ectopy per strip from monitor room.

## 2023-10-31 NOTE — DISCHARGE SUMMARY
Discharge Summary    CHIEF COMPLAINT ON ADMISSION  Chief Complaint   Patient presents with    Sent by MD     Pt found her heart rate to be in atrial fibrillation by her watch over the weekend.  She was told to com to ER after reporting that to her doctor today.  First confirmed in atrial fib 3 weeks ago.  PT is not taking taking blood thinners, h/o Von Willebrand disease.     Chest Pain     Intermittent over weekend       Reason for Admission  Sent by MD     Admission Date  10/30/2023    CODE STATUS  Full Code    HPI & HOSPITAL COURSE  Leola He is a 42 y.o. female with von Willebrand disease, who presented 10/30/2023 with sensation of presyncope, along with intermittent left-sided chest pain which she described as tightness and not related to movement or exertion but related to episodes of low heart rate as detected by her Apple Watch.  She shared that she has been having these issues in the past 1 year, for which she had a Holter monitor which did not show any arrhythmia, and was eventually  attributed to stress.  However in the past 1 week, she has been noticing that her heart rate was running from  on her Apple Watch, and has been having episodes of dizziness on top of her other symptoms.  At times, her Apple Watch have told her that she had atrial fibrillation.  She denies any fevers, chills, abdominal pain, bowel movement changes.  She added that she is had masses on her neck and abdomen for the past 3 to 5 years which is being followed up as outpatient although these have not been biopsied yet due to her von Willebrand disease.  She has had a 40 pound unintentional weight loss in the past several months.     ED course:  On evaluation, vital signs were stable, however her heart rate was noted to be fluctuating from as low as 50 to as high as 90.  Initial blood work-up showed no leukocytosis, with normal electrolytes and renal function, normal liver function test, and negative troponin.  TSH  was normal.  Head CT showed nothing acute.  Chest x-ray (personally reviewed) did not show infiltrates or consolidation.  EKG showed sinus rhythm with no dynamic ischemic changes (my read).  Orthostatic vital signs were normal.  Patient was subsequently admitted to the hospitalist service for further work-up    She underwent a nuclear medicine cardiac stress test that revealed no reversible defects that would indicate ischemia and possible mild hypokinesis likely present in the anterior septal region.  Ventricular size and ejection fraction appear within normal limits.  She underwent a 2D echo that was within normal limits.  Her D-dimer was negative.  Patient was noted to be in sinus rhythm overnight without any arrhythmias on cardiac monitoring.  At this point she is not having active chest pain and her vitals are stable.  Serum metanephrine and 5 HIAA were ordered to rule out pheochromocytoma and carcinoid syndrome given her symptoms of palpitations, flushing and GI related symptoms.  She is instructed to follow-up with her PCP and to undergo biopsy of suspicious masses in her neck and abdomen    Therefore, she is discharged in good and stable condition to home with close outpatient follow-up.    The patient recovered much more quickly than anticipated on admission.    Discharge Date  10/31/23    FOLLOW UP ITEMS POST DISCHARGE  PCP    DISCHARGE DIAGNOSES  Principal Problem (Resolved):    Chest pain, presyncope (POA: Yes)  Active Problems:    Von Willebrand disease (HCC) (POA: Yes)      FOLLOW UP  No future appointments.  No follow-up provider specified.    MEDICATIONS ON DISCHARGE     Medication List        CONTINUE taking these medications        Instructions   ondansetron 4 MG Tbdp  Commonly known as: Zofran ODT   Take 4 mg by mouth every 6 hours as needed for Nausea/Vomiting.  Dose: 4 mg              Allergies  Allergies   Allergen Reactions    Pcn [Penicillins]      hives    Diphenhydramine      Manic attack        DIET  Orders Placed This Encounter   Procedures    Diet Order Diet: Cardiac     Standing Status:   Standing     Number of Occurrences:   1     Order Specific Question:   Diet:     Answer:   Cardiac [6]       ACTIVITY  As tolerated.  Weight bearing as tolerated    CONSULTATIONS  none    PROCEDURES  none    LABORATORY  Lab Results   Component Value Date    SODIUM 140 10/31/2023    POTASSIUM 3.6 10/31/2023    CHLORIDE 107 10/31/2023    CO2 23 10/31/2023    GLUCOSE 80 10/31/2023    BUN 9 10/31/2023    CREATININE 0.54 10/31/2023        Lab Results   Component Value Date    WBC 6.1 10/31/2023    HEMOGLOBIN 13.6 10/31/2023    HEMATOCRIT 39.3 10/31/2023    PLATELETCT 178 10/31/2023        Total time of the discharge process exceeds 32 minutes.

## 2023-10-31 NOTE — PROGRESS NOTES
Patient being discharged via d/c lounge. Pt educated on discharge instructions and new prescriptions, verbalized understanding. Follow up appointment to be made with PCP. PIV removed prior to d/c lounge. Patient going home via car with family.

## 2023-10-31 NOTE — PROGRESS NOTES
4 Eyes Skin Assessment Completed by ARY Merritt and ARY Ferrer.    Head WDL  Ears WDL  Nose WDL  Mouth WDL  Neck WDL  Breast/Chest WDL  Shoulder Blades WDL  Spine WDL  (R) Arm/Elbow/Hand WDL  (L) Arm/Elbow/Hand WDL  Abdomen WDL  Groin WDL  Scrotum/Coccyx/Buttocks WDL  (R) Leg WDL  (L) Leg WDL  (R) Heel/Foot/Toe WDL  (L) Heel/Foot/Toe WDL          Devices In Places Tele Box and Pulse Ox      Interventions In Place Pillows    Possible Skin Injury No    Pictures Uploaded Into Epic N/A  Wound Consult Placed N/A  RN Wound Prevention Protocol Ordered No

## 2023-10-31 NOTE — PROGRESS NOTES
assessment completed.  Pt A&Ox4.  Pt reports 1-2/10 abdominal pain, declines interventions at this time. Pt denies any CP, SOB, nausea, palpitations at this time.  Sinus sherrill noted on telemetry monitor, pt asymptomatic. Pt updated on POC: serial troponins, echocardiogram, AM lab, stress test.  Communication board updated.  Bed in locked, lowest position.  Call light and belongings within reach.  Needs met.

## 2023-10-31 NOTE — PROGRESS NOTES
Received report from day shift RN. Assumed patient care. Pt resting in bed, NAD, A&O x4. Patient states slight pain in the chest and requested tylenol. Tele monitor in place. POC discussed with patient, all questions addressed. Call light within reach.

## 2023-10-31 NOTE — CARE PLAN
The patient is Watcher - Medium risk of patient condition declining or worsening    Shift Goals  Clinical Goals: Tele monitoring, IV fluids, NPO at midnight for Stress test  Patient Goals: Comfort, rest  Family Goals: not at bedside    POC discussed with patient to continue IV fluids, trend labs, tele monitoring, pain management, and NPO at midnight for stress test. Patient verbalized understanding of treatment plan and education.     Problem: Knowledge Deficit - Standard  Goal: Patient and family/care givers will demonstrate understanding of plan of care, disease process/condition, diagnostic tests and medications  Outcome: Progressing     Problem: Pain - Standard  Goal: Alleviation of pain or a reduction in pain to the patient’s comfort goal  Outcome: Progressing

## 2023-11-03 LAB
5HIAA & CREATININE UR-IMP: NORMAL
5OH-INDOLEACETATE 24H UR-MCNC: 0.6 MG/L
5OH-INDOLEACETATE 24H UR-MRATE: NORMAL MG/D (ref 0–15)
5OH-INDOLEACETATE/CREAT 24H UR: 3 MG/GCR (ref 0–14)
COLLECT DURATION TIME SPEC: NORMAL HRS
CREAT 24H UR-MCNC: 18 MG/DL
CREAT 24H UR-MRATE: NORMAL MG/D (ref 700–1600)
SPECIMEN VOL ?TM UR: NORMAL ML

## 2023-11-04 LAB
METANEPHS SERPL-SCNC: 0.29 NMOL/L (ref 0–0.49)
NORMETANEPHRINE SERPL-SCNC: 0.38 NMOL/L (ref 0–0.89)

## 2023-12-08 ENCOUNTER — HOSPITAL ENCOUNTER (EMERGENCY)
Facility: MEDICAL CENTER | Age: 43
End: 2023-12-08
Payer: COMMERCIAL

## 2023-12-08 VITALS
TEMPERATURE: 97.7 F | BODY MASS INDEX: 15.22 KG/M2 | SYSTOLIC BLOOD PRESSURE: 106 MMHG | OXYGEN SATURATION: 97 % | WEIGHT: 97 LBS | HEIGHT: 67 IN | HEART RATE: 93 BPM | DIASTOLIC BLOOD PRESSURE: 75 MMHG | RESPIRATION RATE: 18 BRPM

## 2023-12-08 LAB
ALBUMIN SERPL BCP-MCNC: 4.9 G/DL (ref 3.2–4.9)
ALBUMIN/GLOB SERPL: 1.8 G/DL
ALP SERPL-CCNC: 59 U/L (ref 30–99)
ALT SERPL-CCNC: 11 U/L (ref 2–50)
ANION GAP SERPL CALC-SCNC: 13 MMOL/L (ref 7–16)
AST SERPL-CCNC: 16 U/L (ref 12–45)
BASOPHILS # BLD AUTO: 0.9 % (ref 0–1.8)
BASOPHILS # BLD: 0.06 K/UL (ref 0–0.12)
BILIRUB SERPL-MCNC: 0.9 MG/DL (ref 0.1–1.5)
BUN SERPL-MCNC: 15 MG/DL (ref 8–22)
CALCIUM ALBUM COR SERPL-MCNC: 8.6 MG/DL (ref 8.5–10.5)
CALCIUM SERPL-MCNC: 9.3 MG/DL (ref 8.5–10.5)
CHLORIDE SERPL-SCNC: 101 MMOL/L (ref 96–112)
CO2 SERPL-SCNC: 24 MMOL/L (ref 20–33)
CREAT SERPL-MCNC: 0.55 MG/DL (ref 0.5–1.4)
EKG IMPRESSION: NORMAL
EOSINOPHIL # BLD AUTO: 0.03 K/UL (ref 0–0.51)
EOSINOPHIL NFR BLD: 0.5 % (ref 0–6.9)
ERYTHROCYTE [DISTWIDTH] IN BLOOD BY AUTOMATED COUNT: 36.5 FL (ref 35.9–50)
GFR SERPLBLD CREATININE-BSD FMLA CKD-EPI: 117 ML/MIN/1.73 M 2
GLOBULIN SER CALC-MCNC: 2.7 G/DL (ref 1.9–3.5)
GLUCOSE SERPL-MCNC: 89 MG/DL (ref 65–99)
HCG SERPL QL: NEGATIVE
HCT VFR BLD AUTO: 47 % (ref 37–47)
HGB BLD-MCNC: 16.8 G/DL (ref 12–16)
IMM GRANULOCYTES # BLD AUTO: 0.01 K/UL (ref 0–0.11)
IMM GRANULOCYTES NFR BLD AUTO: 0.2 % (ref 0–0.9)
LIPASE SERPL-CCNC: 30 U/L (ref 11–82)
LYMPHOCYTES # BLD AUTO: 1.94 K/UL (ref 1–4.8)
LYMPHOCYTES NFR BLD: 29.6 % (ref 22–41)
MCH RBC QN AUTO: 30.1 PG (ref 27–33)
MCHC RBC AUTO-ENTMCNC: 35.7 G/DL (ref 32.2–35.5)
MCV RBC AUTO: 84.1 FL (ref 81.4–97.8)
MONOCYTES # BLD AUTO: 0.48 K/UL (ref 0–0.85)
MONOCYTES NFR BLD AUTO: 7.3 % (ref 0–13.4)
NEUTROPHILS # BLD AUTO: 4.04 K/UL (ref 1.82–7.42)
NEUTROPHILS NFR BLD: 61.5 % (ref 44–72)
NRBC # BLD AUTO: 0 K/UL
NRBC BLD-RTO: 0 /100 WBC (ref 0–0.2)
PLATELET # BLD AUTO: 225 K/UL (ref 164–446)
PMV BLD AUTO: 10.8 FL (ref 9–12.9)
POTASSIUM SERPL-SCNC: 3.5 MMOL/L (ref 3.6–5.5)
PROT SERPL-MCNC: 7.6 G/DL (ref 6–8.2)
RBC # BLD AUTO: 5.59 M/UL (ref 4.2–5.4)
SODIUM SERPL-SCNC: 138 MMOL/L (ref 135–145)
TROPONIN T SERPL-MCNC: <6 NG/L (ref 6–19)
WBC # BLD AUTO: 6.6 K/UL (ref 4.8–10.8)

## 2023-12-08 PROCEDURE — 83690 ASSAY OF LIPASE: CPT

## 2023-12-08 PROCEDURE — 84703 CHORIONIC GONADOTROPIN ASSAY: CPT

## 2023-12-08 PROCEDURE — 80053 COMPREHEN METABOLIC PANEL: CPT

## 2023-12-08 PROCEDURE — 302449 STATCHG TRIAGE ONLY (STATISTIC)

## 2023-12-08 PROCEDURE — 93005 ELECTROCARDIOGRAM TRACING: CPT

## 2023-12-08 PROCEDURE — 84484 ASSAY OF TROPONIN QUANT: CPT

## 2023-12-08 PROCEDURE — 85025 COMPLETE CBC W/AUTO DIFF WBC: CPT

## 2023-12-08 ASSESSMENT — FIBROSIS 4 INDEX: FIB4 SCORE: 1

## 2023-12-08 NOTE — ED NOTES
Pt came to front triage desk and expressed the desire to leave without being seen. It was explained to the pt that she can come back anytime if the pain continues or gets worse. Pt signed the ER AMA Consent form and their wrist band was cut off. Pt ambulated out of the ER of their own.

## 2023-12-08 NOTE — ED TRIAGE NOTES
Pt ambulated to triage with   Chief Complaint   Patient presents with    Abdominal Pain     Report mass in abd and in neck, reports Mass in abd is 2 times bigger now.  Reports uanble to and drink d/t pain and n/v.  Pt seen here in October and admitted over night was her for heart issues - heart monitor in place. Appt with cardiologist in January.  Chest pain on and off since June and than seen in October,  pt has been losing wt.   Pt here today concerned about abd pain that is having increased pain.  No blood in stool or vomit. Reports has GI dr hughes on Monday        Protocol ordered.  Pt Informed regarding triage process and verbalized understanding to inform triage tech or RN for any changes in condition. Placed in lobby.

## 2024-05-15 ENCOUNTER — HOSPITAL ENCOUNTER (OUTPATIENT)
Dept: RADIOLOGY | Facility: MEDICAL CENTER | Age: 44
End: 2024-05-15
Attending: INTERNAL MEDICINE
Payer: COMMERCIAL

## 2024-05-15 DIAGNOSIS — R10.13 ABDOMINAL PAIN, EPIGASTRIC: ICD-10-CM

## 2024-05-15 DIAGNOSIS — K62.5 RECTAL BLEEDING: ICD-10-CM

## 2024-05-15 DIAGNOSIS — R63.4 LOSS OF WEIGHT: ICD-10-CM

## 2024-05-22 ENCOUNTER — HOSPITAL ENCOUNTER (OUTPATIENT)
Dept: RADIOLOGY | Facility: MEDICAL CENTER | Age: 44
End: 2024-05-22
Attending: INTERNAL MEDICINE
Payer: COMMERCIAL

## 2024-05-22 DIAGNOSIS — R10.13 ABDOMINAL PAIN, EPIGASTRIC: ICD-10-CM

## 2024-05-22 DIAGNOSIS — K62.5 RECTAL BLEEDING: ICD-10-CM

## 2024-05-22 DIAGNOSIS — R63.4 LOSS OF WEIGHT: ICD-10-CM

## 2024-05-23 ENCOUNTER — HOSPITAL ENCOUNTER (OUTPATIENT)
Facility: MEDICAL CENTER | Age: 44
End: 2024-05-24
Attending: STUDENT IN AN ORGANIZED HEALTH CARE EDUCATION/TRAINING PROGRAM | Admitting: STUDENT IN AN ORGANIZED HEALTH CARE EDUCATION/TRAINING PROGRAM
Payer: COMMERCIAL

## 2024-05-23 DIAGNOSIS — R55 SYNCOPE AND COLLAPSE: ICD-10-CM

## 2024-05-23 DIAGNOSIS — E87.6 HYPOKALEMIA: ICD-10-CM

## 2024-05-23 DIAGNOSIS — G89.29 CHRONIC ABDOMINAL PAIN: ICD-10-CM

## 2024-05-23 DIAGNOSIS — R55 SYNCOPE, UNSPECIFIED SYNCOPE TYPE: ICD-10-CM

## 2024-05-23 DIAGNOSIS — R10.9 CHRONIC ABDOMINAL PAIN: ICD-10-CM

## 2024-05-23 LAB
ALBUMIN SERPL BCP-MCNC: 4.1 G/DL (ref 3.2–4.9)
ALBUMIN/GLOB SERPL: 1.7 G/DL
ALP SERPL-CCNC: 56 U/L (ref 30–99)
ALT SERPL-CCNC: 10 U/L (ref 2–50)
ANION GAP SERPL CALC-SCNC: 13 MMOL/L (ref 7–16)
AST SERPL-CCNC: 16 U/L (ref 12–45)
BASOPHILS # BLD AUTO: 0.6 % (ref 0–1.8)
BASOPHILS # BLD: 0.04 K/UL (ref 0–0.12)
BILIRUB SERPL-MCNC: 0.4 MG/DL (ref 0.1–1.5)
BUN SERPL-MCNC: 10 MG/DL (ref 8–22)
CALCIUM ALBUM COR SERPL-MCNC: 8.6 MG/DL (ref 8.5–10.5)
CALCIUM SERPL-MCNC: 8.7 MG/DL (ref 8.5–10.5)
CHLORIDE SERPL-SCNC: 106 MMOL/L (ref 96–112)
CO2 SERPL-SCNC: 20 MMOL/L (ref 20–33)
CREAT SERPL-MCNC: 0.46 MG/DL (ref 0.5–1.4)
EOSINOPHIL # BLD AUTO: 0.06 K/UL (ref 0–0.51)
EOSINOPHIL NFR BLD: 0.9 % (ref 0–6.9)
ERYTHROCYTE [DISTWIDTH] IN BLOOD BY AUTOMATED COUNT: 38.9 FL (ref 35.9–50)
GFR SERPLBLD CREATININE-BSD FMLA CKD-EPI: 121 ML/MIN/1.73 M 2
GLOBULIN SER CALC-MCNC: 2.4 G/DL (ref 1.9–3.5)
GLUCOSE SERPL-MCNC: 108 MG/DL (ref 65–99)
HCG SERPL QL: NEGATIVE
HCT VFR BLD AUTO: 38.3 % (ref 37–47)
HGB BLD-MCNC: 13.9 G/DL (ref 12–16)
IMM GRANULOCYTES # BLD AUTO: 0.01 K/UL (ref 0–0.11)
IMM GRANULOCYTES NFR BLD AUTO: 0.2 % (ref 0–0.9)
LYMPHOCYTES # BLD AUTO: 2.48 K/UL (ref 1–4.8)
LYMPHOCYTES NFR BLD: 38 % (ref 22–41)
MCH RBC QN AUTO: 30.6 PG (ref 27–33)
MCHC RBC AUTO-ENTMCNC: 36.3 G/DL (ref 32.2–35.5)
MCV RBC AUTO: 84.4 FL (ref 81.4–97.8)
MONOCYTES # BLD AUTO: 0.53 K/UL (ref 0–0.85)
MONOCYTES NFR BLD AUTO: 8.1 % (ref 0–13.4)
NEUTROPHILS # BLD AUTO: 3.41 K/UL (ref 1.82–7.42)
NEUTROPHILS NFR BLD: 52.2 % (ref 44–72)
NRBC # BLD AUTO: 0 K/UL
NRBC BLD-RTO: 0 /100 WBC (ref 0–0.2)
NT-PROBNP SERPL IA-MCNC: <36 PG/ML (ref 0–125)
PLATELET # BLD AUTO: 180 K/UL (ref 164–446)
PMV BLD AUTO: 11.1 FL (ref 9–12.9)
POTASSIUM SERPL-SCNC: 2.7 MMOL/L (ref 3.6–5.5)
PROT SERPL-MCNC: 6.5 G/DL (ref 6–8.2)
RBC # BLD AUTO: 4.54 M/UL (ref 4.2–5.4)
SODIUM SERPL-SCNC: 139 MMOL/L (ref 135–145)
TROPONIN T SERPL-MCNC: <6 NG/L (ref 6–19)
WBC # BLD AUTO: 6.5 K/UL (ref 4.8–10.8)

## 2024-05-23 PROCEDURE — 99223 1ST HOSP IP/OBS HIGH 75: CPT | Performed by: STUDENT IN AN ORGANIZED HEALTH CARE EDUCATION/TRAINING PROGRAM

## 2024-05-23 RX ORDER — ENOXAPARIN SODIUM 100 MG/ML
30 INJECTION SUBCUTANEOUS DAILY
Status: DISCONTINUED | OUTPATIENT
Start: 2024-05-24 | End: 2024-05-24 | Stop reason: HOSPADM

## 2024-05-23 RX ORDER — MAGNESIUM SULFATE HEPTAHYDRATE 40 MG/ML
2 INJECTION, SOLUTION INTRAVENOUS ONCE
Status: COMPLETED | OUTPATIENT
Start: 2024-05-23 | End: 2024-05-24

## 2024-05-23 RX ORDER — POTASSIUM CHLORIDE 20 MEQ/1
40 TABLET, EXTENDED RELEASE ORAL ONCE
Status: COMPLETED | OUTPATIENT
Start: 2024-05-23 | End: 2024-05-23

## 2024-05-23 RX ORDER — SODIUM CHLORIDE, SODIUM LACTATE, POTASSIUM CHLORIDE, CALCIUM CHLORIDE 600; 310; 30; 20 MG/100ML; MG/100ML; MG/100ML; MG/100ML
INJECTION, SOLUTION INTRAVENOUS CONTINUOUS
Status: DISCONTINUED | OUTPATIENT
Start: 2024-05-24 | End: 2024-05-24 | Stop reason: HOSPADM

## 2024-05-23 RX ORDER — ONDANSETRON 4 MG/1
4 TABLET, ORALLY DISINTEGRATING ORAL EVERY 6 HOURS PRN
Status: DISCONTINUED | OUTPATIENT
Start: 2024-05-23 | End: 2024-05-24 | Stop reason: HOSPADM

## 2024-05-23 RX ORDER — ACETAMINOPHEN 325 MG/1
650 TABLET ORAL EVERY 6 HOURS PRN
Status: DISCONTINUED | OUTPATIENT
Start: 2024-05-23 | End: 2024-05-24 | Stop reason: HOSPADM

## 2024-05-23 RX ORDER — METOCLOPRAMIDE HYDROCHLORIDE 5 MG/ML
10 INJECTION INTRAMUSCULAR; INTRAVENOUS ONCE
Status: COMPLETED | OUTPATIENT
Start: 2024-05-23 | End: 2024-05-23

## 2024-05-23 RX ORDER — SODIUM CHLORIDE, SODIUM LACTATE, POTASSIUM CHLORIDE, CALCIUM CHLORIDE 600; 310; 30; 20 MG/100ML; MG/100ML; MG/100ML; MG/100ML
1000 INJECTION, SOLUTION INTRAVENOUS ONCE
Status: COMPLETED | OUTPATIENT
Start: 2024-05-23 | End: 2024-05-24

## 2024-05-23 RX ORDER — DIAZEPAM 5 MG/ML
2.5 INJECTION, SOLUTION INTRAMUSCULAR; INTRAVENOUS EVERY 4 HOURS PRN
Status: DISCONTINUED | OUTPATIENT
Start: 2024-05-23 | End: 2024-05-24 | Stop reason: HOSPADM

## 2024-05-23 RX ADMIN — POTASSIUM CHLORIDE 40 MEQ: 1500 TABLET, EXTENDED RELEASE ORAL at 22:27

## 2024-05-23 RX ADMIN — DIAZEPAM 2.5 MG: 5 INJECTION INTRAMUSCULAR; INTRAVENOUS at 22:59

## 2024-05-23 RX ADMIN — MAGNESIUM SULFATE HEPTAHYDRATE 2 G: 2 INJECTION, SOLUTION INTRAVENOUS at 22:32

## 2024-05-23 RX ADMIN — SODIUM CHLORIDE, POTASSIUM CHLORIDE, SODIUM LACTATE AND CALCIUM CHLORIDE 1000 ML: 600; 310; 30; 20 INJECTION, SOLUTION INTRAVENOUS at 22:27

## 2024-05-23 RX ADMIN — METOCLOPRAMIDE 10 MG: 5 INJECTION, SOLUTION INTRAMUSCULAR; INTRAVENOUS at 22:27

## 2024-05-23 ASSESSMENT — ENCOUNTER SYMPTOMS
FEVER: 0
NAUSEA: 0
FALLS: 1
COUGH: 0
DIARRHEA: 0
ABDOMINAL PAIN: 0
CHILLS: 0
VOMITING: 0
SHORTNESS OF BREATH: 0

## 2024-05-23 ASSESSMENT — FIBROSIS 4 INDEX: FIB4 SCORE: 0.92

## 2024-05-24 VITALS
OXYGEN SATURATION: 95 % | HEART RATE: 58 BPM | TEMPERATURE: 98.8 F | DIASTOLIC BLOOD PRESSURE: 63 MMHG | HEIGHT: 67 IN | RESPIRATION RATE: 16 BRPM | SYSTOLIC BLOOD PRESSURE: 104 MMHG | BODY MASS INDEX: 15.29 KG/M2 | WEIGHT: 97.44 LBS

## 2024-05-24 PROBLEM — E83.39 HYPOPHOSPHATEMIA: Status: ACTIVE | Noted: 2024-05-24

## 2024-05-24 PROBLEM — R55 SYNCOPE AND COLLAPSE: Status: RESOLVED | Noted: 2024-05-23 | Resolved: 2024-05-24

## 2024-05-24 PROBLEM — E83.39 HYPOPHOSPHATEMIA: Status: RESOLVED | Noted: 2024-05-24 | Resolved: 2024-05-24

## 2024-05-24 PROBLEM — E87.6 HYPOKALEMIA: Status: RESOLVED | Noted: 2024-05-24 | Resolved: 2024-05-24

## 2024-05-24 PROBLEM — E87.6 HYPOKALEMIA: Status: ACTIVE | Noted: 2024-05-24

## 2024-05-24 LAB
ANION GAP SERPL CALC-SCNC: 11 MMOL/L (ref 7–16)
BASOPHILS # BLD AUTO: 0.5 % (ref 0–1.8)
BASOPHILS # BLD: 0.04 K/UL (ref 0–0.12)
BUN SERPL-MCNC: 6 MG/DL (ref 8–22)
CALCIUM SERPL-MCNC: 8.3 MG/DL (ref 8.5–10.5)
CHLORIDE SERPL-SCNC: 110 MMOL/L (ref 96–112)
CO2 SERPL-SCNC: 20 MMOL/L (ref 20–33)
CREAT SERPL-MCNC: 0.36 MG/DL (ref 0.5–1.4)
EKG IMPRESSION: NORMAL
EOSINOPHIL # BLD AUTO: 0.06 K/UL (ref 0–0.51)
EOSINOPHIL NFR BLD: 0.8 % (ref 0–6.9)
ERYTHROCYTE [DISTWIDTH] IN BLOOD BY AUTOMATED COUNT: 38.3 FL (ref 35.9–50)
GFR SERPLBLD CREATININE-BSD FMLA CKD-EPI: 129 ML/MIN/1.73 M 2
GLUCOSE SERPL-MCNC: 94 MG/DL (ref 65–99)
HCT VFR BLD AUTO: 37.3 % (ref 37–47)
HGB BLD-MCNC: 13.4 G/DL (ref 12–16)
IMM GRANULOCYTES # BLD AUTO: 0.02 K/UL (ref 0–0.11)
IMM GRANULOCYTES NFR BLD AUTO: 0.3 % (ref 0–0.9)
LYMPHOCYTES # BLD AUTO: 1.98 K/UL (ref 1–4.8)
LYMPHOCYTES NFR BLD: 26.4 % (ref 22–41)
MAGNESIUM SERPL-MCNC: 1.8 MG/DL (ref 1.5–2.5)
MCH RBC QN AUTO: 30.2 PG (ref 27–33)
MCHC RBC AUTO-ENTMCNC: 35.9 G/DL (ref 32.2–35.5)
MCV RBC AUTO: 84.2 FL (ref 81.4–97.8)
MONOCYTES # BLD AUTO: 0.59 K/UL (ref 0–0.85)
MONOCYTES NFR BLD AUTO: 7.9 % (ref 0–13.4)
NEUTROPHILS # BLD AUTO: 4.82 K/UL (ref 1.82–7.42)
NEUTROPHILS NFR BLD: 64.1 % (ref 44–72)
NRBC # BLD AUTO: 0 K/UL
NRBC BLD-RTO: 0 /100 WBC (ref 0–0.2)
PHOSPHATE SERPL-MCNC: 2.4 MG/DL (ref 2.5–4.5)
PHOSPHATE SERPL-MCNC: 2.7 MG/DL (ref 2.5–4.5)
PLATELET # BLD AUTO: 174 K/UL (ref 164–446)
PMV BLD AUTO: 11.3 FL (ref 9–12.9)
POTASSIUM SERPL-SCNC: 3.9 MMOL/L (ref 3.6–5.5)
RBC # BLD AUTO: 4.43 M/UL (ref 4.2–5.4)
SODIUM SERPL-SCNC: 141 MMOL/L (ref 135–145)
WBC # BLD AUTO: 7.5 K/UL (ref 4.8–10.8)

## 2024-05-24 RX ADMIN — SODIUM CHLORIDE, POTASSIUM CHLORIDE, SODIUM LACTATE AND CALCIUM CHLORIDE: 600; 310; 30; 20 INJECTION, SOLUTION INTRAVENOUS at 01:15

## 2024-05-24 RX ADMIN — DIBASIC SODIUM PHOSPHATE, MONOBASIC POTASSIUM PHOSPHATE AND MONOBASIC SODIUM PHOSPHATE 250 MG: 852; 155; 130 TABLET ORAL at 01:03

## 2024-05-24 RX ADMIN — DIBASIC SODIUM PHOSPHATE, MONOBASIC POTASSIUM PHOSPHATE AND MONOBASIC SODIUM PHOSPHATE 250 MG: 852; 155; 130 TABLET ORAL at 05:56

## 2024-05-24 SDOH — ECONOMIC STABILITY: TRANSPORTATION INSECURITY
IN THE PAST 12 MONTHS, HAS THE LACK OF TRANSPORTATION KEPT YOU FROM MEDICAL APPOINTMENTS OR FROM GETTING MEDICATIONS?: NO

## 2024-05-24 SDOH — ECONOMIC STABILITY: TRANSPORTATION INSECURITY
IN THE PAST 12 MONTHS, HAS LACK OF RELIABLE TRANSPORTATION KEPT YOU FROM MEDICAL APPOINTMENTS, MEETINGS, WORK OR FROM GETTING THINGS NEEDED FOR DAILY LIVING?: NO

## 2024-05-24 ASSESSMENT — SOCIAL DETERMINANTS OF HEALTH (SDOH)
WITHIN THE LAST YEAR, HAVE YOU BEEN HUMILIATED OR EMOTIONALLY ABUSED IN OTHER WAYS BY YOUR PARTNER OR EX-PARTNER?: NO
WITHIN THE LAST YEAR, HAVE YOU BEEN KICKED, HIT, SLAPPED, OR OTHERWISE PHYSICALLY HURT BY YOUR PARTNER OR EX-PARTNER?: NO
WITHIN THE LAST YEAR, HAVE TO BEEN RAPED OR FORCED TO HAVE ANY KIND OF SEXUAL ACTIVITY BY YOUR PARTNER OR EX-PARTNER?: NO
WITHIN THE PAST 12 MONTHS, THE FOOD YOU BOUGHT JUST DIDN'T LAST AND YOU DIDN'T HAVE MONEY TO GET MORE: NEVER TRUE
IN THE PAST 12 MONTHS, HAS THE ELECTRIC, GAS, OIL, OR WATER COMPANY THREATENED TO SHUT OFF SERVICE IN YOUR HOME?: NO
WITHIN THE LAST YEAR, HAVE YOU BEEN AFRAID OF YOUR PARTNER OR EX-PARTNER?: NO
WITHIN THE PAST 12 MONTHS, YOU WORRIED THAT YOUR FOOD WOULD RUN OUT BEFORE YOU GOT THE MONEY TO BUY MORE: NEVER TRUE

## 2024-05-24 ASSESSMENT — LIFESTYLE VARIABLES
ALCOHOL_USE: NO
ON A TYPICAL DAY WHEN YOU DRINK ALCOHOL HOW MANY DRINKS DO YOU HAVE: 0
EVER HAD A DRINK FIRST THING IN THE MORNING TO STEADY YOUR NERVES TO GET RID OF A HANGOVER: NO
HAVE PEOPLE ANNOYED YOU BY CRITICIZING YOUR DRINKING: NO
TOTAL SCORE: 0
CONSUMPTION TOTAL: NEGATIVE
HOW MANY TIMES IN THE PAST YEAR HAVE YOU HAD 5 OR MORE DRINKS IN A DAY: 0
EVER FELT BAD OR GUILTY ABOUT YOUR DRINKING: NO
AVERAGE NUMBER OF DAYS PER WEEK YOU HAVE A DRINK CONTAINING ALCOHOL: 0
HAVE YOU EVER FELT YOU SHOULD CUT DOWN ON YOUR DRINKING: NO
TOTAL SCORE: 0
TOTAL SCORE: 0

## 2024-05-24 ASSESSMENT — PATIENT HEALTH QUESTIONNAIRE - PHQ9
1. LITTLE INTEREST OR PLEASURE IN DOING THINGS: NOT AT ALL
2. FEELING DOWN, DEPRESSED, IRRITABLE, OR HOPELESS: NOT AT ALL
SUM OF ALL RESPONSES TO PHQ9 QUESTIONS 1 AND 2: 0

## 2024-05-24 ASSESSMENT — PAIN DESCRIPTION - PAIN TYPE
TYPE: ACUTE PAIN
TYPE: ACUTE PAIN

## 2024-05-24 ASSESSMENT — FIBROSIS 4 INDEX: FIB4 SCORE: 1.21

## 2024-05-24 NOTE — ASSESSMENT & PLAN NOTE
Recurrent episodes of syncope since 09/2023.  Secondary to 30-40 pound unintentional weight loss due to abdominal pain  She says her watch also keeps recording atrial fibrillation with a heart rate up to the 170s.  She had a Zio patch done at [facility] and she said it did not report A-fib however, her overall she does continue to report atrial fibrillation  Will monitor on telemetry, outpatient follow-up with cardiology

## 2024-05-24 NOTE — CARE PLAN
"The patient is Stable - Low risk of patient condition declining or worsening    Shift Goals  Clinical Goals: Monitor labs; IV hydration;  Patient Goals: \"figure out what's going on\"  Family Goals: N/A    Problem: Knowledge Deficit - Standard  Goal: Patient and family/care givers will demonstrate understanding of plan of care, disease process/condition, diagnostic tests and medications  Description: Target End Date:  1-3 days or as soon as patient condition allows    1.  Patient oriented to unit, equipment, visitation policy and means for communicating concern  2.  Complete/review Learning Assessment  3.  Assess knowledge level of disease process/condition, treatment plan, diagnostic tests and medications  4.  Explain disease process/condition, treatment plan, diagnostic tests and medications  Outcome: Progressing     Problem: Syncope Management  Goal: Patient's vital signs will be with within normal range or improve  Description: Target End Date:  Prior to discharge or change in level of care    1.  Educate the patient to change positions slowly to allow BP to accommodate and prevent future episodes  2.  Evaluate patient orthostatic blood pressure per order and as needed  3.  Evaluate patient blood pressure per order and as needed  4.  Encourage adequate fluid intake to prevent worsening hypotension as appropriate  Outcome: Progressing     "

## 2024-05-24 NOTE — H&P
Hospital Medicine History & Physical Note    Date of Service  5/23/2024    Primary Care Physician  RAY Berg    Code Status  Full Code    Chief Complaint  Chief Complaint   Patient presents with    Dizziness     BIB [facility] for episode of dizziness with syncope and possible head strike while on toilet. - thinners  Per patient, extensive medical complaints work-ups over the past year, with no diagnosis.         History of Presenting Illness  Leola He is a 43 y.o. female who presented 5/23/2024 with syncope.  Patient had a syncopal event while walking to the bathroom, she says she feels she has A-fib, her watch has been reading atrial fibrillation this past few months.  This has been a recurrent issue for the patient since 09/2023.  She has seen a cardiologist and has had a Zio patch done but was told did not report any atrial fibrillation, she says she has not been getting any responses from her cardiologist lately.    She has had 30-40 pound unintentional weight loss and has frequent episodes of abdominal pain.  She is following with a GI physician who has done multiple studies on her, so far they noticed that she has delayed gastric emptying.  She also had a HIDA scan, she says results were borderline and she was told she may need a cholecystectomy.    In the ED afebrile, hemodynamically stable.  Labs show potassium 2.7.    I discussed the plan of care with patient, bedside RN, and EDP .    Review of Systems  Review of Systems   Constitutional:  Negative for chills and fever.   Respiratory:  Negative for cough and shortness of breath.    Cardiovascular:  Negative for chest pain.   Gastrointestinal:  Negative for abdominal pain, diarrhea, nausea and vomiting.   Genitourinary:  Negative for dysuria and urgency.   Musculoskeletal:  Positive for falls.       Past Medical History   has a past medical history of Asthma, Bronchitis, Chest tightness, Cough, Daytime sleepiness, Depression, Dizziness,  Earache, Fatigue, Frequent urination, Hoarseness, persistent, Lumps on the skin, Morning headache, Nasal drainage, Painful breathing, Rhinitis, Shortness of breath, Sore muscles, Sore throat, chronic, Toothache, Weakness, and Wears glasses.    Surgical History   has a past surgical history that includes primary c section and hysterectomy laparoscopy.     Family History  Family history reviewed with patient. There is no family history that is pertinent to the chief complaint.     Social History   reports that she has never smoked. She has never used smokeless tobacco. She reports current alcohol use. She reports that she does not currently use drugs.    Allergies  Allergies   Allergen Reactions    Pcn [Penicillins]      hives    Diphenhydramine      Manic attack       Medications  Prior to Admission Medications   Prescriptions Last Dose Informant Patient Reported? Taking?   ondansetron (ZOFRAN ODT) 4 MG TABLET DISPERSIBLE   Yes No   Sig: Take 4 mg by mouth every 6 hours as needed for Nausea/Vomiting.      Facility-Administered Medications: None       Physical Exam  Temp:  [36.9 °C (98.4 °F)] 36.9 °C (98.4 °F)  Pulse:  [56-89] 62  Resp:  [16-20] 16  BP: (108-115)/(58-80) 115/80  SpO2:  [92 %-97 %] 97 %  Blood Pressure: 110/58   Temperature: 36.9 °C (98.4 °F)   Pulse: (!) 56   Respiration: 18   Pulse Oximetry: 93 %       Physical Exam  Constitutional:       Comments: Thin   HENT:      Head: Normocephalic and atraumatic.      Mouth/Throat:      Mouth: Mucous membranes are moist.      Pharynx: No oropharyngeal exudate or posterior oropharyngeal erythema.   Cardiovascular:      Rate and Rhythm: Normal rate and regular rhythm.      Pulses: Normal pulses.      Heart sounds: Normal heart sounds. No murmur heard.  Pulmonary:      Effort: Pulmonary effort is normal. No respiratory distress.      Breath sounds: Normal breath sounds.   Musculoskeletal:         General: No swelling or tenderness. Normal range of motion.   Skin:      General: Skin is warm and dry.   Neurological:      General: No focal deficit present.      Mental Status: She is alert and oriented to person, place, and time.   Psychiatric:         Mood and Affect: Mood normal.         Laboratory:  Recent Labs     05/23/24 2122   WBC 6.5   RBC 4.54   HEMOGLOBIN 13.9   HEMATOCRIT 38.3   MCV 84.4   MCH 30.6   MCHC 36.3*   RDW 38.9   PLATELETCT 180   MPV 11.1     Recent Labs     05/23/24 2122   SODIUM 139   POTASSIUM 2.7*   CHLORIDE 106   CO2 20   GLUCOSE 108*   BUN 10   CREATININE 0.46*   CALCIUM 8.7     Recent Labs     05/23/24 2122   ALTSGPT 10   ASTSGOT 16   ALKPHOSPHAT 56   TBILIRUBIN 0.4   GLUCOSE 108*         Recent Labs     05/23/24 2122   NTPROBNP <36         Recent Labs     05/23/24 2122   TROPONINT <6       Imaging:  No orders to display       EKG:  I have personally reviewed the images and compared with prior images. and My impression is: NSR    Assessment/Plan:  Justification for Admission Status  I anticipate this patient is appropriate for observation status at this time because syncope  * Syncope and collapse- (present on admission)  Assessment & Plan  Recurrent episodes of syncope since 09/2023.  Secondary to 30-40 pound unintentional weight loss due to abdominal pain  She says her watch also keeps recording atrial fibrillation with a heart rate up to the 170s.  She had a Zio patch done at [facility] and she said it did not report A-fib however, her overall she does continue to report atrial fibrillation  Will monitor on telemetry, outpatient follow-up with cardiology    Hypokalemia- (present on admission)  Assessment & Plan  2.7 on presentation.  Secondary to decreased p.o. intake and significant weight loss, recurrent issue in the past few months  Replete as needed, check magnesium levels.  BMP ordered to continue monitoring  Monitor on telemetry    Hypophosphatemia- (present on admission)  Assessment & Plan  2.4 on presentation.  Replete as needed,  phosphorus levels ordered continue monitoring        VTE prophylaxis: enoxaparin ppx

## 2024-05-24 NOTE — PROGRESS NOTES
0038 patient arrived to CDU. Patient able to ambulate from gurney to bed with SBA. Gait slightly unsteady.  Patient seen Aox4.   Plan of care discussed. Will monitor labs and replace electrolytes prn, and will also continue IV hydration. Patient verbalizes understanding.  Patient having no complaints of pain at this time.  IV fluids started per provider order.  Patient educated on fall risk and instructed to call for help prior to ambulation.   All questions answered at this time.  Call light and personal belongings within reach, bed locked and in lowest position and hourly rounding in place.

## 2024-05-24 NOTE — ED NOTES
Medication history reviewed with patient at bedside.   Med rec is complete  Allergies reviewed.     Patient has not had any outpatient antibiotics in the last 30 days.   Anticoagulants: No       Jose Miguel Scott

## 2024-05-24 NOTE — PROGRESS NOTES
4 Eyes Skin Assessment Completed by ARY Navarrete and ARY Alfred.    Head WDL  Ears WDL  Nose WDL  Mouth WDL  Neck WDL  Breast/Chest WDL  Shoulder Blades WDL  Spine WDL  (R) Arm/Elbow/Hand WDL  (L) Arm/Elbow/Hand WDL  Abdomen WDL  Groin WDL  Scrotum/Coccyx/Buttocks WDL  (R) Leg WDL  (L) Leg WDL  (R) Heel/Foot/Toe WDL  (L) Heel/Foot/Toe WDL          Devices In Places Pulse Ox      Interventions In Place Pillows    Possible Skin Injury No    Pictures Uploaded Into Epic N/A  Wound Consult Placed N/A  RN Wound Prevention Protocol Ordered No

## 2024-05-24 NOTE — ED PROVIDER NOTES
ER Provider Note    Scribed for Ronny Underwood M.D. by Lucinda Kolb. 5/23/2024   9:14 PM    Primary Care Provider: RAY Berg    CHIEF COMPLAINT  Chief Complaint   Patient presents with    Dizziness     BIB [facility] for episode of dizziness with syncope and possible head strike while on toilet. - thinners  Per patient, extensive medical complaints work-ups over the past year, with no diagnosis.       EXTERNAL RECORDS REVIEWED  Inpatient Notes Patient seen and admitted to the hospital on 10/30/23 for chest pain and presyncope. At this time she was experiencing symptoms of lightheadedness with a range of heart rates from . and Outpatient Notes Yesterday patient underwent a gastric emptying with nuclear medicine.    HPI/ROS  LIMITATION TO HISTORY   Select: : None  OUTSIDE HISTORIAN(S):  Significant other  is at bedside and provides helpful information    Crystal Angelica He is a 43 y.o. female who presents to the ED, via EMS to bedside, for evaluation of acute dizziness with subsequent syncope just prior to arrival. The patient states that at about 730 PM she started experiencing abdominal pain and cramping. She felt her heart racing and went to the bathroom. While attempting to use the restroom she had a syncopal episode.  is at bedside and states that he heard a thud from the other room and went to find her. He found her half on the toilet and believes she hit her head on the bath tub. Patient last remembers looking at her apple watch prior to passing out and noticed her heart rate was in the 40s. She has been experiencing a recent history of self reported afib according to her apple watch. She also reports non specific symptoms of recent weight loss and one episode of urinary incontinence today. She is otherwise healthy and takes no daily medications. She did not bite her tongue following today's events. She does report having somewhat chronic abdominal pain daily. She has  undergone colonoscopy and endoscopy. She is not on any blood thinners. She denies any symptoms of vomiting. Hot showers do not help her symptoms.    PAST MEDICAL HISTORY  Past Medical History:   Diagnosis Date    Asthma     Bronchitis     Chest tightness     Cough     Daytime sleepiness     Depression     Dizziness     Earache     Fatigue     Frequent urination     Hoarseness, persistent     Lumps on the skin     Morning headache     Nasal drainage     Painful breathing     Rhinitis     Shortness of breath     Sore muscles     Sore throat, chronic     Toothache     Weakness     Wears glasses        SURGICAL HISTORY  Past Surgical History:   Procedure Laterality Date    HYSTERECTOMY LAPAROSCOPY      PRIMARY C SECTION         FAMILY HISTORY  None noted    SOCIAL HISTORY   reports that she has never smoked. She has never used smokeless tobacco. She reports current alcohol use. She reports that she does not currently use drugs.    CURRENT MEDICATIONS  Current Discharge Medication List        CONTINUE these medications which have NOT CHANGED    Details   ondansetron (ZOFRAN ODT) 4 MG TABLET DISPERSIBLE Take 4 mg by mouth every 6 hours as needed for Nausea/Vomiting.             ALLERGIES  Allergies   Allergen Reactions    Pcn [Penicillins]      hives    Diphenhydramine      Manic attack        PHYSICAL EXAM  Wt 44 kg (97 lb)   BMI 15.19 kg/m²    General: thin appearing, poor dentition, alert  Head: dry mucous membranes Normocephalic atraumatic  Eyes: Extraocular motion intact  Neck: Supple, no rigidity, no C-spine tenderness  Cardiovascular: Regular rate and rhythm no murmurs rubs or gallops  Respiratory: Clear to auscultation bilaterally, equal chest rise and fall, no increased work of breathing  Abdomen: Soft nontender no guarding  Musculoskeletal: Warm and well perfused, no peripheral edema  NEURO: Cranial nerves II through XII intact.  5 out of 5 strength with elbow flexion/tension, wrist flexion/extension,   bilaterally.  5 out of 5 strength with hip flexion/extension, knee flexion/distention, ankle flexion/plantarflexion, flexion extension of the toes bilaterally.  Sensation tact light touch x4.  Gait intact.  Alert and oriented x3.  2+ DTRs bilaterally.   No pronator drift.  Integumentary: No wounds or rashes    DIAGNOSTIC STUDIES    Labs:   Labs Reviewed   CBC WITH DIFFERENTIAL - Abnormal; Notable for the following components:       Result Value    MCHC 36.3 (*)     All other components within normal limits   COMP METABOLIC PANEL - Abnormal; Notable for the following components:    Potassium 2.7 (*)     Glucose 108 (*)     Creatinine 0.46 (*)     All other components within normal limits   PHOSPHORUS - Abnormal; Notable for the following components:    Phosphorus 2.4 (*)     All other components within normal limits   TROPONIN   HCG QUAL SERUM   PROBRAIN NATRIURETIC PEPTIDE, NT   ESTIMATED GFR   MAGNESIUM   BASIC METABOLIC PANEL   CBC WITH DIFFERENTIAL   PHOSPHORUS       hCG negative, significant hypokalemia, likely due to GI losses, troponin negative, proBNP not elevated.  CBC shows no anemia or leukocytosis  .  EKG:   I have independently interpreted this EKG as detailed above.  Results for orders placed or performed during the hospital encounter of 24   EKG   Result Value Ref Range    Report       [facility] Emergency Dept.    Test Date:  2024  Pt Name:    ANA COBIAN             Department: ER  MRN:        [id number]                      Room:        26  Gender:     Female                       Technician: [id number]  :        1980                   Requested By:ER TRIAGE PROTOCOL  Order #:    [id number]                    Reading MD:    Measurements  Intervals                                Axis  Rate:       67                           P:          72  NY:         170                          QRS:        45  QRSD:       101                          T:          60  QT:          401  QTc:        424    Interpretive Statements  Sinus rhythm  Low voltage, extremity and precordial leads  Compared to ECG 12/08/2023 11:42:19  No significant changes          INITIAL ASSESSMENT COURSE AND PLAN  Care Narrative     9:14 PM - Triage protocol ordered for EKG, CBC with differential, CMP, Troponin, and HCG qual. Patient was evaluated at bedside.  Patient verbalizes understanding and support with my plan of care.  Differential diagnoses include but not limited to: Vasovagal episode, dehydration, electrolyte abnormality, cardiac syncope, ectopic pregnancy, anemia, considered appendicitis however no focal tenderness in the right lower quadrant, considered torsion however abdominal pain appears to be chronic and baseline.    10:07 PM- Critical potassium of 2.4. Discussed this at bedside with the patient. I had a shared decision making conversation with the patient regarding further plan of care. Patient does not want to be admitted and would rather have her symptoms treated prior to discharge home. The patient will be medicated with Reglan 10 mg, Potassium chloride tablet, Magnesium sulfate, and LR bolus for rehydration for her symptoms.    Hydration: Based on the patient's presentation of Dehydration the patient was given IV fluids. IV Hydration was used because oral hydration was not adequate alone. Upon recheck following hydration, the patient was mildly improved.      Given significant hypokalemia, patient will be admitted for repletion, cardiac monitoring    DISPOSITION AND DISCUSSIONS    I have discussed management of the patient with the following physicians and BLAYNE's: Dr. Cota Hospitalist    Discussion of management with other Hospitals in [place] or appropriate source(s): None     Escalation of care considered, and ultimately not performed: diagnostic imaging.  Considered CT head, C-spine, however patient mechanism is minimal, no external evidence of trauma, and no midline tenderness.   no  cephalohematoma    Decision tools and prescription drugs considered including, but not limited to: NEXUS criteria negative .    FINAL DIAGNOSIS  1. Hypokalemia    2. Chronic abdominal pain    3. Syncope, unspecified syncope type         I, Lucinda Kolb (Melinda), am scribing for, and in the presence of, Brian Underwood M.D..    Electronically signed by: Lucinda Kolb (Melinda), 5/23/2024    I, Brian Underwood M.D. personally performed the services described in this documentation, as scribed by Lucinda Kolb in my presence, and it is both accurate and complete.      The note accurately reflects work and decisions made by me.  Brian Underwood M.D.  5/24/2024  12:56 AM

## 2024-05-24 NOTE — DISCHARGE INSTRUCTIONS
Diet    Heart Healthy Diet    A Heart-Healthy diet includes increasing healthy fats and limiting unhealthy fats.  Focus on eating a balance of foods, including fruits and vegetables, low-fat or nonfat dairy, lean protein, nuts and legumes, whole grains, and heart-healthy oils and fats.  Monitor your salt (sodium) intake, especially if you have high blood pressure.  Lose weight if you are overweight. Losing just 5-10% of your body weight can help your overall health and prevent diseases such as diabetes and heart disease.        Syncope, Adult    Syncope is when you pass out or faint for a short time. It is caused by a sudden decrease in blood flow to the brain. This can happen for many reasons. It can sometimes happen when seeing blood, getting a shot (injection), or having pain or strong emotions. Most causes of fainting are not dangerous, but in some cases it can be a sign of a serious medical problem.  If you faint, get help right away. Call your local emergency services (911 in the [place]  Follow these instructions at home:  Watch for any changes in your symptoms. Take these actions to stay safe and help with your symptoms:  Knowing when you may be about to faint  Signs that you may be about to faint include:  Feeling dizzy or light-headed. It may feel like the room is spinning.  Feeling weak.  Feeling like you may vomit (nauseous).  Seeing spots or seeing all white or all black.  Having cold, clammy skin.  Feeling warm and sweaty.  Hearing ringing in the ears.  If you start to feel like you might faint, sit or lie down right away. If sitting, lower your head down between your legs. If lying down, raise (elevate) your feet above the level of your heart.  Breathe deeply and steadily. Wait until all of the symptoms are gone.  Have someone stay with you until you feel better.  Medicines  Take over-the-counter and prescription medicines only as told by your doctor.  If you are taking blood pressure or heart  medicine, sit up and stand up slowly. Spend a few minutes getting ready to sit and then stand. This can help you feel less dizzy.  Lifestyle  Do not drive, use machinery, or play sports until your doctor says it is okay.  Do not drink alcohol.  Do not smoke or use any products that contain nicotine or tobacco. If you need help quitting, ask your doctor.  Avoid hot tubs and saunas.  General instructions  Talk with your doctor about your symptoms. You may need to have testing to help find the cause.  Drink enough fluid to keep your pee (urine) pale yellow.  Avoid standing for a long time. If you must stand for a long time, do movements such as:  Moving your legs.  Crossing your legs.  Flexing and stretching your leg muscles.  Squatting.  Keep all follow-up visits.  Contact a doctor if:  You have episodes of near fainting.  Get help right away if:  You pass out or faint.  You hit your head or are injured after fainting.  You have any of these symptoms:  Fast or uneven heartbeats (palpitations).  Pain in your chest, belly, or back.  Shortness of breath.  You have jerky movements that you cannot control (seizure).  You have a very bad headache.  You are confused.  You have problems with how you see (vision).  You are very weak.  You have trouble walking.  You are bleeding from your mouth or your butt (rectum).  You have black or tarry poop (stool).  These symptoms may be an emergency. Get help right away. Call your local emergency services (911 in the [place]  Do not wait to see if the symptoms will go away.  Do not drive yourself to the hospital.  Summary  Syncope is when you pass out or faint for a short time. It is caused by a sudden decrease in blood flow to the brain.  Signs that you may be about to faint include feeling dizzy or light-headed, feeling like you may vomit, seeing all white or all black, or having cold, clammy skin.  If you start to feel like you might faint, sit or lie down right away. Lower your head  if sitting, or raise (elevate) your feet if lying down. Breathe deeply and steadily. Wait until all of the symptoms are gone.  This information is not intended to replace advice given to you by your health care provider. Make sure you discuss any questions you have with your health care provider.  Document Revised: 04/28/2022 Document Reviewed: 04/28/2022  Elsevier Patient Education © 2023 [employer]

## 2024-05-24 NOTE — ASSESSMENT & PLAN NOTE
2.7 on presentation.  Secondary to decreased p.o. intake and significant weight loss, recurrent issue in the past few months  Replete as needed, check magnesium levels.  BMP ordered to continue monitoring  Monitor on telemetry

## 2024-05-24 NOTE — PROGRESS NOTES
Discharge lounge orders placed, all belongings with patient, provided clothing from clothes closet on discharge

## 2024-06-11 ENCOUNTER — OFFICE VISIT (OUTPATIENT)
Dept: CARDIOLOGY | Facility: MEDICAL CENTER | Age: 44
End: 2024-06-11
Attending: INTERNAL MEDICINE
Payer: COMMERCIAL

## 2024-06-11 VITALS
BODY MASS INDEX: 15.07 KG/M2 | HEIGHT: 67 IN | WEIGHT: 96 LBS | HEART RATE: 79 BPM | SYSTOLIC BLOOD PRESSURE: 100 MMHG | RESPIRATION RATE: 14 BRPM | DIASTOLIC BLOOD PRESSURE: 60 MMHG | OXYGEN SATURATION: 97 %

## 2024-06-11 DIAGNOSIS — R55 SYNCOPE AND COLLAPSE: Primary | ICD-10-CM

## 2024-06-11 DIAGNOSIS — R06.09 DYSPNEA ON EXERTION: ICD-10-CM

## 2024-06-11 DIAGNOSIS — R63.4 UNINTENTIONAL WEIGHT LOSS: ICD-10-CM

## 2024-06-11 DIAGNOSIS — Q67.6 PECTUS EXCAVATUM: ICD-10-CM

## 2024-06-11 DIAGNOSIS — E87.6 HYPOKALEMIA: ICD-10-CM

## 2024-06-11 DIAGNOSIS — R00.2 PALPITATIONS: ICD-10-CM

## 2024-06-11 LAB — EKG IMPRESSION: NORMAL

## 2024-06-11 PROCEDURE — 93010 ELECTROCARDIOGRAM REPORT: CPT | Performed by: INTERNAL MEDICINE

## 2024-06-11 PROCEDURE — 99204 OFFICE O/P NEW MOD 45 MIN: CPT | Performed by: INTERNAL MEDICINE

## 2024-06-11 PROCEDURE — 99211 OFF/OP EST MAY X REQ PHY/QHP: CPT | Performed by: INTERNAL MEDICINE

## 2024-06-11 PROCEDURE — 3074F SYST BP LT 130 MM HG: CPT | Performed by: INTERNAL MEDICINE

## 2024-06-11 PROCEDURE — 3078F DIAST BP <80 MM HG: CPT | Performed by: INTERNAL MEDICINE

## 2024-06-11 PROCEDURE — G2211 COMPLEX E/M VISIT ADD ON: HCPCS | Performed by: INTERNAL MEDICINE

## 2024-06-11 PROCEDURE — 93005 ELECTROCARDIOGRAM TRACING: CPT | Performed by: INTERNAL MEDICINE

## 2024-06-11 ASSESSMENT — FIBROSIS 4 INDEX: FIB4 SCORE: 1.25

## 2024-06-11 NOTE — PROGRESS NOTES
"CARDIOLOGY NEW PATIENT:    PCP: RAY Berg    1. Syncope and collapse    2. Unintentional weight loss    3. Hypokalemia    4. Dyspnea on exertion    5. Palpitations    6. Pectus excavatum        Leola He is here for consultation regarding palpitations and syncope    Chief Complaint   Patient presents with    Syncope     NP DX: Syncope and collapse       History: Leola He is a 43 y.o. female with ?  Von Willebrand disease, is here for palpitations and syncope consultation.    Palpitations for about a year, few times a week. With dizziness and SOB during tehse episodes, last few minutes. Random and sporadic. Also noticed intermittent low HR during the day 30-40's bpm during which she feels tired. Also feels weak with CAMARILLO. Sometimes orthostatic lightheadedness.     On 5/23/24 had stomach pain, walked upstairs, felt palpitations /dizzy then noticed slow HR then she had syncope while sitting on the toilet (not during defecation or micturition). Her K was 2.7 then, 3.9 on discharge. Had similar episode about 3 months prior.     Wore Zio patch for 5 days at [facility] in Jan 2024.  Was not satisfied with their care and switching providers    Had log COVID, with issues with gastroparesis and gallbladder issues. Also with weight loss, seeing GI. Had EGD and C scopes done, not revealing. has right neck LN, followed by ENT.    Also with low energy and CAMARILLO over last year.    TTE 10/2023: personally reviewed  Normal      Non smoker  Non alcohol use  Has 3 children  No marijuana use  adopted  No exercise routine    PE:  /60 (BP Location: Left arm, Patient Position: Sitting, BP Cuff Size: Adult)   Pulse 79   Resp 14   Ht 1.702 m (5' 7\")   Wt 43.5 kg (96 lb)   SpO2 97%   BMI 15.04 kg/m²     Gen: well, low body weight  Negative Marfan wrist and thumb sign  HEENT: Symmetric face.  NECK: No JVD.   CARDIAC: Regular, Normal S1, S2, No murmur, pectus excavatum " noted  VASCULATURE: carotids are normal bilaterally without bruit  RESP: Clear to auscultation bilaterally   EXT: No edema  SKIN: Warm and dry  NEURO: No gross deficits  PSYCH: Appropriate affect, participates in conversation    The ASCVD Risk score (Tre GONSALEZ, et al., 2019) failed to calculate.    Past Medical History:   Diagnosis Date    Asthma     Bronchitis     Chest tightness     Cough     Daytime sleepiness     Depression     Dizziness     Earache     Fatigue     Frequent urination     Hoarseness, persistent     Lumps on the skin     Morning headache     Nasal drainage     Painful breathing     Rhinitis     Shortness of breath     Sore muscles     Sore throat, chronic     Toothache     Weakness     Wears glasses      Past Surgical History:   Procedure Laterality Date    HYSTERECTOMY LAPAROSCOPY      PRIMARY C SECTION       Allergies   Allergen Reactions    Pcn [Penicillins]      hives    Diphenhydramine      Manic attack    Magnesium Oxybate [Sodium Oxybate] Unspecified     MAGNESIUM     Outpatient Encounter Medications as of 6/11/2024   Medication Sig Dispense Refill    ondansetron (ZOFRAN ODT) 4 MG TABLET DISPERSIBLE Take 4 mg by mouth every 6 hours as needed for Nausea/Vomiting.       No facility-administered encounter medications on file as of 6/11/2024.     Social History     Socioeconomic History    Marital status:      Spouse name: Not on file    Number of children: Not on file    Years of education: Not on file    Highest education level: Not on file   Occupational History    Not on file   Tobacco Use    Smoking status: Never    Smokeless tobacco: Never   Vaping Use    Vaping status: Never Used   Substance and Sexual Activity    Alcohol use: Yes    Drug use: Not Currently     Comment: former use Gummies    Sexual activity: Not on file   Other Topics Concern    Not on file   Social History Narrative    Not on file     Social Determinants of Health     Financial Resource Strain: Not on file   Food  "Insecurity: No Food Insecurity (5/24/2024)    Hunger Vital Sign     Worried About Running Out of Food in the Last Year: Never true     Ran Out of Food in the Last Year: Never true   Transportation Needs: No Transportation Needs (5/24/2024)    PRAPARE - Transportation     Lack of Transportation (Medical): No     Lack of Transportation (Non-Medical): No   Physical Activity: Not on file   Stress: Not on file   Social Connections: Unknown (2/23/2024)    Received from [facility]    Social Connections     In the past 3 months, do you feel that you lack companionship or social support?: Not on file   Intimate Partner Violence: Not At Risk (5/24/2024)    Humiliation, Afraid, Rape, and Kick questionnaire     Fear of Current or Ex-Partner: No     Emotionally Abused: No     Physically Abused: No     Sexually Abused: No   Housing Stability: Low Risk  (5/24/2024)    Housing Stability Vital Sign     Unable to Pay for Housing in the Last Year: No     Number of Places Lived in the Last Year: 1     Unstable Housing in the Last Year: No     History reviewed. No pertinent family history.      Studies    Lab Results   Component Value Date/Time    TSHULTRASEN 1.000 10/12/2022 1809      No results found for: \"FREET4\"   Lab Results   Component Value Date/Time    HBA1C 4.6 10/30/2023 10:34 AM     Lab Results   Component Value Date/Time    CHOLSTRLTOT 121 10/30/2023 10:34 AM    LDL 68 10/30/2023 10:34 AM    HDL 38 (A) 10/30/2023 10:34 AM    TRIGLYCERIDE 73 10/30/2023 10:34 AM       Lab Results   Component Value Date/Time    SODIUM 141 05/24/2024 04:46 AM    POTASSIUM 3.9 05/24/2024 04:46 AM    CHLORIDE 110 05/24/2024 04:46 AM    CO2 20 05/24/2024 04:46 AM    GLUCOSE 94 05/24/2024 04:46 AM    BUN 6 (L) 05/24/2024 04:46 AM    CREATININE 0.36 (L) 05/24/2024 04:46 AM    BUNCREATRAT 26.0 02/07/2022 04:40 PM     Lab Results   Component Value Date/Time    ALKPHOSPHAT 56 05/23/2024 09:22 PM    ASTSGOT 16 05/23/2024 09:22 PM    ALTSGPT 10 " 2024 09:22 PM    TBILIRUBIN 0.4 2024 09:22 PM        Echocardiogram:  No results found for this or any previous visit.    EC2024 personally reviewed and interpreted  Sinus rhythm   Borderline low voltage, extremity leads   Compared to ECG 2024 21:10:45   Sinus arrhythmia no longer present   Electronically Signed On 2024 10:46:13 PDT by Tremaine Venegas MD     Assessment and Recommendations:    Problem List Items Addressed This Visit       Syncope and collapse - Primary    Relevant Orders    EKG (Completed)    Cardiac Event Monitor    NM-CARDIAC TREADMILL ONLY-NO IMAGING    Hypokalemia    Relevant Orders    URINE POTASSIUM, 24 HR    Unintentional weight loss    Relevant Orders    Basic Metabolic Panel    ALDOLASE    CPK - TOTAL SERUM    Sed Rate    TREPONEMA PALLIDUM ANTIBODY, IGG BY IFA (CSF)    RPR (SYPHILIS)    HIV AG/AB COMBO ASSAY DIAGNOSTIC    HEP C VIRUS ANTIBODY    HEP B SURFACE ANTIGEN    VITAMIN D,25 HYDROXY (DEFICIENCY)    TSH+FREE T4    Dyspnea on exertion    Relevant Orders    NM-CARDIAC TREADMILL ONLY-NO IMAGING    Palpitations    Pectus excavatum     Mrs. He continues to suffer from quality of life limiting symptoms of palpitations, fatigue, dyspnea on exertion without so revealing workup thus far.  We agreed on the following:    --Several blood work and urine tests ordered (mainly given her constitutional symptoms along with unintentional ongoing weight loss).  To be followed thoroughly and closely with PCP team  --Treadmill test test to evaluate heart rate variation with stress, blood pressure response to stress and exercise capacity  --Will be arranged for a repeat 2-week cardiac event monitor    Follow-up based on results    Thank you for the opportunity to be involved in Leola He 's  complex  care; and please reach out with any questions or concerns.     () Today's E/M visit is associated with medical care services that serve as the continuing  focal point for all needed health care services and/or with medical care services that  are part of ongoing cardiac care related to a patient's single, serious condition, or a complex condition: This includes  furnishing services to patients on an ongoing basis that result in care that is personalized  to the patient. The services result in a comprehensive, longitudinal, and continuous  relationship with the patient and involve delivery of team-based care that is accessible, coordinated with other practitioners and providers, and integrated with the broader health  care landscape.     Return if symptoms worsen or fail to improve.    Tremaine Venegas MD, MPH [facility]  Interventional Cardiologist  [facility]   of Clinical Internal Medicine - [facility]    ~ Portions of this note were completed using voice recognition software (Dragon Naturally speaking software) . Occasional transcription errors may have escaped proof reading. I have made every reasonable attempt to correct obvious errors, but I expect that there are errors of grammar and possibly content that I did not discover before finalizing the note. ~

## 2024-06-19 ENCOUNTER — NON-PROVIDER VISIT (OUTPATIENT)
Dept: CARDIOLOGY | Facility: MEDICAL CENTER | Age: 44
End: 2024-06-19
Attending: PHYSICIAN ASSISTANT
Payer: COMMERCIAL

## 2024-06-19 DIAGNOSIS — R55 SYNCOPE AND COLLAPSE: ICD-10-CM

## 2024-06-19 PROCEDURE — 93246 EXT ECG>7D<15D RECORDING: CPT

## 2024-06-19 NOTE — PROGRESS NOTES
How Severe Are Your Spot(S)?: mild Patient enrolled in the 14 day o Holter monitoring program per Tremaine Venegas MD.  >Office hook-up, serial # [id number].  >Currently pending EOS.     Have Your Spot(S) Been Treated In The Past?: has not been treated Hpi Title: Evaluation of Skin Lesions Year Removed: 1900

## 2024-06-28 ENCOUNTER — HOSPITAL ENCOUNTER (OUTPATIENT)
Dept: RADIOLOGY | Facility: MEDICAL CENTER | Age: 44
End: 2024-06-28
Attending: INTERNAL MEDICINE
Payer: COMMERCIAL

## 2024-06-28 DIAGNOSIS — T18.9XXA SWALLOWED FOREIGN BODY, INITIAL ENCOUNTER: ICD-10-CM

## 2024-06-28 DIAGNOSIS — R10.13 EPIGASTRIC PAIN: ICD-10-CM

## 2024-06-28 PROCEDURE — 74019 RADEX ABDOMEN 2 VIEWS: CPT

## 2024-07-10 ENCOUNTER — HOSPITAL ENCOUNTER (OUTPATIENT)
Dept: RADIOLOGY | Facility: MEDICAL CENTER | Age: 44
End: 2024-07-10
Attending: INTERNAL MEDICINE
Payer: COMMERCIAL

## 2024-07-10 ENCOUNTER — TELEPHONE (OUTPATIENT)
Dept: CARDIOLOGY | Facility: MEDICAL CENTER | Age: 44
End: 2024-07-10

## 2024-07-10 DIAGNOSIS — R55 SYNCOPE AND COLLAPSE: ICD-10-CM

## 2024-07-10 DIAGNOSIS — R94.39 ABNORMAL STRESS ECG WITH TREADMILL: ICD-10-CM

## 2024-07-10 DIAGNOSIS — R00.2 PALPITATIONS: ICD-10-CM

## 2024-07-10 DIAGNOSIS — R06.09 DYSPNEA ON EXERTION: ICD-10-CM

## 2024-07-10 PROCEDURE — 93018 CV STRESS TEST I&R ONLY: CPT | Performed by: INTERNAL MEDICINE

## 2024-07-10 PROCEDURE — 93017 CV STRESS TEST TRACING ONLY: CPT

## 2024-07-13 ENCOUNTER — HOSPITAL ENCOUNTER (OUTPATIENT)
Dept: LAB | Facility: MEDICAL CENTER | Age: 44
End: 2024-07-13
Attending: INTERNAL MEDICINE
Payer: COMMERCIAL

## 2024-07-13 DIAGNOSIS — R63.4 UNINTENTIONAL WEIGHT LOSS: ICD-10-CM

## 2024-07-13 LAB
25(OH)D3 SERPL-MCNC: 44 NG/ML (ref 30–100)
ANION GAP SERPL CALC-SCNC: 12 MMOL/L (ref 7–16)
BUN SERPL-MCNC: 11 MG/DL (ref 8–22)
CALCIUM SERPL-MCNC: 9.4 MG/DL (ref 8.5–10.5)
CHLORIDE SERPL-SCNC: 104 MMOL/L (ref 96–112)
CK SERPL-CCNC: 31 U/L (ref 0–154)
CO2 SERPL-SCNC: 23 MMOL/L (ref 20–33)
CREAT SERPL-MCNC: 0.47 MG/DL (ref 0.5–1.4)
ERYTHROCYTE [SEDIMENTATION RATE] IN BLOOD BY WESTERGREN METHOD: 5 MM/HOUR (ref 0–25)
GFR SERPLBLD CREATININE-BSD FMLA CKD-EPI: 121 ML/MIN/1.73 M 2
GLUCOSE SERPL-MCNC: 90 MG/DL (ref 65–99)
HBV SURFACE AG SER QL: NORMAL
HCV AB SER QL: NORMAL
HIV 1+2 AB+HIV1 P24 AG SERPL QL IA: NORMAL
POTASSIUM SERPL-SCNC: 3.7 MMOL/L (ref 3.6–5.5)
SODIUM SERPL-SCNC: 139 MMOL/L (ref 135–145)
T PALLIDUM AB SER QL IA: NORMAL
T4 FREE SERPL-MCNC: 1.17 NG/DL (ref 0.93–1.7)
TSH SERPL DL<=0.005 MIU/L-ACNC: 0.98 UIU/ML (ref 0.38–5.33)

## 2024-07-13 PROCEDURE — 84443 ASSAY THYROID STIM HORMONE: CPT

## 2024-07-13 PROCEDURE — 86780 TREPONEMA PALLIDUM: CPT

## 2024-07-13 PROCEDURE — 85652 RBC SED RATE AUTOMATED: CPT

## 2024-07-13 PROCEDURE — 87340 HEPATITIS B SURFACE AG IA: CPT

## 2024-07-13 PROCEDURE — 82306 VITAMIN D 25 HYDROXY: CPT

## 2024-07-13 PROCEDURE — 82085 ASSAY OF ALDOLASE: CPT

## 2024-07-13 PROCEDURE — 82550 ASSAY OF CK (CPK): CPT

## 2024-07-13 PROCEDURE — 36415 COLL VENOUS BLD VENIPUNCTURE: CPT

## 2024-07-13 PROCEDURE — 86803 HEPATITIS C AB TEST: CPT

## 2024-07-13 PROCEDURE — 84439 ASSAY OF FREE THYROXINE: CPT

## 2024-07-13 PROCEDURE — 80048 BASIC METABOLIC PNL TOTAL CA: CPT

## 2024-07-13 PROCEDURE — G0475 HIV COMBINATION ASSAY: HCPCS

## 2024-07-15 ENCOUNTER — TELEPHONE (OUTPATIENT)
Dept: CARDIOLOGY | Facility: MEDICAL CENTER | Age: 44
End: 2024-07-15
Payer: COMMERCIAL

## 2024-07-15 LAB — ALDOLASE SERPL-CCNC: 2 U/L (ref 1.2–7.6)

## 2024-07-17 LAB — T PALLIDUM IGG SER QL IF: NON REACTIVE

## 2024-07-23 ENCOUNTER — HOSPITAL ENCOUNTER (OUTPATIENT)
Dept: RADIOLOGY | Facility: MEDICAL CENTER | Age: 44
End: 2024-07-23
Attending: INTERNAL MEDICINE
Payer: COMMERCIAL

## 2024-07-23 DIAGNOSIS — R94.39 ABNORMAL STRESS ECG WITH TREADMILL: ICD-10-CM

## 2024-07-23 DIAGNOSIS — R55 SYNCOPE AND COLLAPSE: ICD-10-CM

## 2024-07-23 PROCEDURE — 4410556 CT-CARDIAC SCORING (SELF PAY ONLY)

## 2024-07-24 ENCOUNTER — TELEPHONE (OUTPATIENT)
Dept: CARDIOLOGY | Facility: MEDICAL CENTER | Age: 44
End: 2024-07-24
Payer: COMMERCIAL

## 2024-07-24 DIAGNOSIS — R94.39 ABNORMAL STRESS ECG WITH TREADMILL: ICD-10-CM

## 2025-01-10 ENCOUNTER — HOSPITAL ENCOUNTER (EMERGENCY)
Facility: MEDICAL CENTER | Age: 45
End: 2025-01-10
Attending: EMERGENCY MEDICINE
Payer: COMMERCIAL

## 2025-01-10 VITALS
HEART RATE: 73 BPM | SYSTOLIC BLOOD PRESSURE: 105 MMHG | HEIGHT: 67 IN | BODY MASS INDEX: 15.92 KG/M2 | TEMPERATURE: 96.7 F | RESPIRATION RATE: 18 BRPM | OXYGEN SATURATION: 95 % | DIASTOLIC BLOOD PRESSURE: 64 MMHG | WEIGHT: 101.41 LBS

## 2025-01-10 DIAGNOSIS — N39.0 ACUTE UTI: ICD-10-CM

## 2025-01-10 DIAGNOSIS — K31.84 GASTROPARESIS: ICD-10-CM

## 2025-01-10 DIAGNOSIS — E86.0 DEHYDRATION: ICD-10-CM

## 2025-01-10 LAB
ALBUMIN SERPL BCP-MCNC: 4.4 G/DL (ref 3.2–4.9)
ALBUMIN/GLOB SERPL: 1.6 G/DL
ALP SERPL-CCNC: 63 U/L (ref 30–99)
ALT SERPL-CCNC: 14 U/L (ref 2–50)
ANION GAP SERPL CALC-SCNC: 12 MMOL/L (ref 7–16)
APPEARANCE UR: CLEAR
AST SERPL-CCNC: 29 U/L (ref 12–45)
BACTERIA #/AREA URNS HPF: ABNORMAL /HPF
BASOPHILS # BLD AUTO: 0.6 % (ref 0–1.8)
BASOPHILS # BLD: 0.05 K/UL (ref 0–0.12)
BILIRUB SERPL-MCNC: 0.9 MG/DL (ref 0.1–1.5)
BILIRUB UR QL STRIP.AUTO: ABNORMAL
BUN SERPL-MCNC: 9 MG/DL (ref 8–22)
CALCIUM ALBUM COR SERPL-MCNC: 9.1 MG/DL (ref 8.5–10.5)
CALCIUM SERPL-MCNC: 9.4 MG/DL (ref 8.5–10.5)
CASTS URNS QL MICRO: ABNORMAL /LPF (ref 0–2)
CHLORIDE SERPL-SCNC: 102 MMOL/L (ref 96–112)
CO2 SERPL-SCNC: 22 MMOL/L (ref 20–33)
COLOR UR: ABNORMAL
CREAT SERPL-MCNC: 0.62 MG/DL (ref 0.5–1.4)
EOSINOPHIL # BLD AUTO: 0.03 K/UL (ref 0–0.51)
EOSINOPHIL NFR BLD: 0.3 % (ref 0–6.9)
EPITHELIAL CELLS 1715: ABNORMAL /HPF (ref 0–5)
ERYTHROCYTE [DISTWIDTH] IN BLOOD BY AUTOMATED COUNT: 38.3 FL (ref 35.9–50)
GFR SERPLBLD CREATININE-BSD FMLA CKD-EPI: 112 ML/MIN/1.73 M 2
GLOBULIN SER CALC-MCNC: 2.7 G/DL (ref 1.9–3.5)
GLUCOSE SERPL-MCNC: 167 MG/DL (ref 65–99)
GLUCOSE UR STRIP.AUTO-MCNC: ABNORMAL MG/DL
HCG SERPL QL: NEGATIVE
HCT VFR BLD AUTO: 42.5 % (ref 37–47)
HGB BLD-MCNC: 15 G/DL (ref 12–16)
IMM GRANULOCYTES # BLD AUTO: 0.03 K/UL (ref 0–0.11)
IMM GRANULOCYTES NFR BLD AUTO: 0.3 % (ref 0–0.9)
KETONES UR STRIP.AUTO-MCNC: ABNORMAL MG/DL
LEUKOCYTE ESTERASE UR QL STRIP.AUTO: ABNORMAL
LIPASE SERPL-CCNC: 23 U/L (ref 11–82)
LYMPHOCYTES # BLD AUTO: 1.16 K/UL (ref 1–4.8)
LYMPHOCYTES NFR BLD: 13 % (ref 22–41)
MCH RBC QN AUTO: 29.9 PG (ref 27–33)
MCHC RBC AUTO-ENTMCNC: 35.3 G/DL (ref 32.2–35.5)
MCV RBC AUTO: 84.7 FL (ref 81.4–97.8)
MICRO URNS: ABNORMAL
MONOCYTES # BLD AUTO: 0.45 K/UL (ref 0–0.85)
MONOCYTES NFR BLD AUTO: 5.1 % (ref 0–13.4)
NEUTROPHILS # BLD AUTO: 7.19 K/UL (ref 1.82–7.42)
NEUTROPHILS NFR BLD: 80.7 % (ref 44–72)
NITRITE UR QL STRIP.AUTO: ABNORMAL
NRBC # BLD AUTO: 0 K/UL
NRBC BLD-RTO: 0 /100 WBC (ref 0–0.2)
PH UR STRIP.AUTO: ABNORMAL [PH] (ref 5–8)
PLATELET # BLD AUTO: 239 K/UL (ref 164–446)
PMV BLD AUTO: 10 FL (ref 9–12.9)
POTASSIUM SERPL-SCNC: 3.6 MMOL/L (ref 3.6–5.5)
PROT SERPL-MCNC: 7.1 G/DL (ref 6–8.2)
PROT UR QL STRIP: ABNORMAL MG/DL
RBC # BLD AUTO: 5.02 M/UL (ref 4.2–5.4)
RBC # URNS HPF: ABNORMAL /HPF (ref 0–2)
RBC UR QL AUTO: ABNORMAL
SODIUM SERPL-SCNC: 136 MMOL/L (ref 135–145)
SP GR UR STRIP.AUTO: ABNORMAL
UROBILINOGEN UR STRIP.AUTO-MCNC: ABNORMAL EU/DL
WBC # BLD AUTO: 8.9 K/UL (ref 4.8–10.8)
WBC #/AREA URNS HPF: ABNORMAL /HPF

## 2025-01-10 PROCEDURE — 85025 COMPLETE CBC W/AUTO DIFF WBC: CPT

## 2025-01-10 PROCEDURE — 99284 EMERGENCY DEPT VISIT MOD MDM: CPT

## 2025-01-10 PROCEDURE — 83690 ASSAY OF LIPASE: CPT

## 2025-01-10 PROCEDURE — 81001 URINALYSIS AUTO W/SCOPE: CPT

## 2025-01-10 PROCEDURE — 36415 COLL VENOUS BLD VENIPUNCTURE: CPT

## 2025-01-10 PROCEDURE — 80053 COMPREHEN METABOLIC PANEL: CPT

## 2025-01-10 PROCEDURE — 700102 HCHG RX REV CODE 250 W/ 637 OVERRIDE(OP): Performed by: EMERGENCY MEDICINE

## 2025-01-10 PROCEDURE — A9270 NON-COVERED ITEM OR SERVICE: HCPCS | Performed by: EMERGENCY MEDICINE

## 2025-01-10 PROCEDURE — 700105 HCHG RX REV CODE 258: Performed by: EMERGENCY MEDICINE

## 2025-01-10 PROCEDURE — 96375 TX/PRO/DX INJ NEW DRUG ADDON: CPT

## 2025-01-10 PROCEDURE — 700111 HCHG RX REV CODE 636 W/ 250 OVERRIDE (IP): Mod: JZ | Performed by: EMERGENCY MEDICINE

## 2025-01-10 PROCEDURE — 96365 THER/PROPH/DIAG IV INF INIT: CPT

## 2025-01-10 PROCEDURE — 84703 CHORIONIC GONADOTROPIN ASSAY: CPT

## 2025-01-10 RX ORDER — ONDANSETRON 2 MG/ML
4 INJECTION INTRAMUSCULAR; INTRAVENOUS ONCE
Status: COMPLETED | OUTPATIENT
Start: 2025-01-10 | End: 2025-01-10

## 2025-01-10 RX ORDER — ONDANSETRON 4 MG/1
4 TABLET, ORALLY DISINTEGRATING ORAL EVERY 6 HOURS PRN
Qty: 10 TABLET | Refills: 0 | Status: SHIPPED | OUTPATIENT
Start: 2025-01-10

## 2025-01-10 RX ORDER — SODIUM CHLORIDE 9 MG/ML
1000 INJECTION, SOLUTION INTRAVENOUS ONCE
Status: COMPLETED | OUTPATIENT
Start: 2025-01-10 | End: 2025-01-10

## 2025-01-10 RX ORDER — ACETAMINOPHEN 325 MG/1
650 TABLET ORAL ONCE
Status: COMPLETED | OUTPATIENT
Start: 2025-01-10 | End: 2025-01-10

## 2025-01-10 RX ORDER — KETOROLAC TROMETHAMINE 15 MG/ML
15 INJECTION, SOLUTION INTRAMUSCULAR; INTRAVENOUS ONCE
Status: COMPLETED | OUTPATIENT
Start: 2025-01-10 | End: 2025-01-10

## 2025-01-10 RX ORDER — CEPHALEXIN 500 MG/1
500 CAPSULE ORAL 3 TIMES DAILY
Qty: 15 CAPSULE | Refills: 0 | Status: ACTIVE | OUTPATIENT
Start: 2025-01-10 | End: 2025-01-15

## 2025-01-10 RX ADMIN — ONDANSETRON 4 MG: 2 INJECTION INTRAMUSCULAR; INTRAVENOUS at 10:37

## 2025-01-10 RX ADMIN — ACETAMINOPHEN 650 MG: 325 TABLET ORAL at 12:26

## 2025-01-10 RX ADMIN — CEFAZOLIN 2 G: 2 INJECTION, POWDER, FOR SOLUTION INTRAMUSCULAR; INTRAVENOUS at 11:55

## 2025-01-10 RX ADMIN — SODIUM CHLORIDE 1000 ML: 9 INJECTION, SOLUTION INTRAVENOUS at 10:37

## 2025-01-10 RX ADMIN — KETOROLAC TROMETHAMINE 15 MG: 15 INJECTION, SOLUTION INTRAMUSCULAR; INTRAVENOUS at 10:38

## 2025-01-10 ASSESSMENT — PAIN DESCRIPTION - PAIN TYPE
TYPE: ACUTE PAIN
TYPE: ACUTE PAIN

## 2025-01-10 ASSESSMENT — FIBROSIS 4 INDEX: FIB4 SCORE: 1.28

## 2025-01-10 NOTE — ED PROVIDER NOTES
ED Provider Note    CHIEF COMPLAINT  Chief Complaint   Patient presents with    UTI     Began yesterday, states has chronic UTI hx, increased frequency, dysuria, darker color    N/V     Began this morning, vomiting +bile, last BM yesterday normal, hx gastroparesis     Facial Pain     This morning phone fell on L side of face, 8/10 cheek pain       HPI/ROS    Leola He is a 44 y.o. female who presents with nausea and vomiting.  The patient states she has a history of recurrent urinary tract infections.  She started having symptoms last night with frequent urination as well as dysuria.  This morning she awoke and unfortunately flipped her phone up and struck her in the nose where she has a small abrasion.  She has developed a headache.  She continued to have nausea and vomiting this morning and started feeling dizzy.  She has a known history of gastroparesis and she is concerned for dehydration.  She is also noted her urine is very dark.  She is also had some chills and some back pain.    PAST MEDICAL HISTORY   has a past medical history of Asthma, Bronchitis, Chest tightness, Cough, Daytime sleepiness, Depression, Dizziness, Earache, Fatigue, Frequent urination, Hoarseness, persistent, Lumps on the skin, Morning headache, Nasal drainage, Painful breathing, Rhinitis, Shortness of breath, Sore muscles, Sore throat, chronic, Toothache, Weakness, and Wears glasses.    SURGICAL HISTORY   has a past surgical history that includes primary c section and hysterectomy laparoscopy.    FAMILY HISTORY  History reviewed. No pertinent family history.    SOCIAL HISTORY  Social History     Tobacco Use    Smoking status: Never    Smokeless tobacco: Never   Vaping Use    Vaping status: Never Used   Substance and Sexual Activity    Alcohol use: Not Currently    Drug use: Not Currently     Comment: former use Gummies    Sexual activity: Not on file       CURRENT MEDICATIONS  Home Medications       Reviewed by Kallie GONZALES  "BRUCE Hernandez (Registered Nurse) on 01/10/25 at 0907  Med List Status: Partial     Medication Last Dose Status   ondansetron (ZOFRAN ODT) 4 MG TABLET DISPERSIBLE  Active                    ALLERGIES  Allergies   Allergen Reactions    Pcn [Penicillins]      hives    Diphenhydramine      Manic attack    Magnesium Oxybate [Sodium Oxybate] Unspecified     MAGNESIUM       PHYSICAL EXAM  VITAL SIGNS: /85   Pulse 96   Temp 36.5 °C (97.7 °F) (Temporal)   Resp 18   Ht 1.702 m (5' 7\")   Wt 46 kg (101 lb 6.6 oz)   SpO2 98%   BMI 15.88 kg/m²    In general the patient does not appear toxic    Facial examination the patient has a small abrasion to the nasal bridge with no obvious deformities.  Intranasal exam shows no septal deviation.    Pulmonary the patient's lungs are clear to auscultation bilaterally    Cardiovascular S1-S2 with a regular rate and rhythm    GI abdomen is soft    Back exam the patient does have some right CVA tenderness    Skin no rashes, pallor, no jaundice    Extremities atraumatic    Neurologic examination is grossly intact    EKG/LABS  Results for orders placed or performed during the hospital encounter of 01/10/25   CBC with Differential    Collection Time: 01/10/25  9:13 AM   Result Value Ref Range    WBC 8.9 4.8 - 10.8 K/uL    RBC 5.02 4.20 - 5.40 M/uL    Hemoglobin 15.0 12.0 - 16.0 g/dL    Hematocrit 42.5 37.0 - 47.0 %    MCV 84.7 81.4 - 97.8 fL    MCH 29.9 27.0 - 33.0 pg    MCHC 35.3 32.2 - 35.5 g/dL    RDW 38.3 35.9 - 50.0 fL    Platelet Count 239 164 - 446 K/uL    MPV 10.0 9.0 - 12.9 fL    Neutrophils-Polys 80.70 (H) 44.00 - 72.00 %    Lymphocytes 13.00 (L) 22.00 - 41.00 %    Monocytes 5.10 0.00 - 13.40 %    Eosinophils 0.30 0.00 - 6.90 %    Basophils 0.60 0.00 - 1.80 %    Immature Granulocytes 0.30 0.00 - 0.90 %    Nucleated RBC 0.00 0.00 - 0.20 /100 WBC    Neutrophils (Absolute) 7.19 1.82 - 7.42 K/uL    Lymphs (Absolute) 1.16 1.00 - 4.80 K/uL    Monos (Absolute) 0.45 0.00 - 0.85 K/uL "    Eos (Absolute) 0.03 0.00 - 0.51 K/uL    Baso (Absolute) 0.05 0.00 - 0.12 K/uL    Immature Granulocytes (abs) 0.03 0.00 - 0.11 K/uL    NRBC (Absolute) 0.00 K/uL   Complete Metabolic Panel    Collection Time: 01/10/25  9:13 AM   Result Value Ref Range    Sodium 136 135 - 145 mmol/L    Potassium 3.6 3.6 - 5.5 mmol/L    Chloride 102 96 - 112 mmol/L    Co2 22 20 - 33 mmol/L    Anion Gap 12.0 7.0 - 16.0    Glucose 167 (H) 65 - 99 mg/dL    Bun 9 8 - 22 mg/dL    Creatinine 0.62 0.50 - 1.40 mg/dL    Calcium 9.4 8.5 - 10.5 mg/dL    Correct Calcium 9.1 8.5 - 10.5 mg/dL    AST(SGOT) 29 12 - 45 U/L    ALT(SGPT) 14 2 - 50 U/L    Alkaline Phosphatase 63 30 - 99 U/L    Total Bilirubin 0.9 0.1 - 1.5 mg/dL    Albumin 4.4 3.2 - 4.9 g/dL    Total Protein 7.1 6.0 - 8.2 g/dL    Globulin 2.7 1.9 - 3.5 g/dL    A-G Ratio 1.6 g/dL   Lipase    Collection Time: 01/10/25  9:13 AM   Result Value Ref Range    Lipase 23 11 - 82 U/L   ESTIMATED GFR    Collection Time: 01/10/25  9:13 AM   Result Value Ref Range    GFR (CKD-EPI) 112 >60 mL/min/1.73 m 2   HCG QUAL SERUM    Collection Time: 01/10/25  9:13 AM   Result Value Ref Range    Beta-Hcg Qualitative Serum Negative Negative   Urinalysis    Collection Time: 01/10/25 10:20 AM    Specimen: Urine   Result Value Ref Range    Color Orange (A)     Character Clear     Specific Gravity See comment <1.035    Ph See comment 5.0 - 8.0    Glucose See comment Negative mg/dL    Ketones See comment Negative mg/dL    Protein See comment Negative mg/dL    Bilirubin See comment Negative    Urobilinogen, Urine See comment <=1.0 EU/dL    Nitrite See comment Negative    Leukocyte Esterase See comment Negative    Occult Blood See comment Negative    Micro Urine Req Microscopic    URINE MICROSCOPIC (W/UA)    Collection Time: 01/10/25 10:20 AM   Result Value Ref Range    WBC 21-50 (A) /hpf    RBC 3-5 (A) 0 - 2 /hpf    Bacteria Few (A) None /hpf    Epithelial Cells 6-10 (A) 0 - 5 /hpf    Urine Casts 0-2 0 - 2 /lpf          COURSE & MEDICAL DECISION MAKING    This a 44-year-old female who presents with a couple of different complaints.  Clinically she did appear dehydrated she does have a known history of gastroparesis.  With her current nausea and vomiting I did give a liter of fluid intravenously.  Fortunately the metabolic panel does not show any evidence of acidosis.  Clinically she does not appear septic nor toxic.  Her urinalysis is consistent with a urinary tract infection and she will receive Ancef prior to discharge and should be discharged home on Keflex.  She does have a penicillin allergy but she states she has had cephalosporins in the past with no complications.  She also has a contusion to the nasal bridge.  I did give the patient Toradol for pain control.  She will be discharged home with instructions to take Motrin and Tylenol.  I like her to stay well-hydrated.  I would also like the patient to recheck with her primary care doctor in 7 to 10 days to make sure the urinary tract infection is improving.  She will return sooner if she is acutely worse.  I will also prescribe Zofran for her nausea and vomiting and she will return for persistent vomiting.    FINAL DIAGNOSIS  1.  Urinary tract infection  2.  Gastroparesis  3.  Dehydration  4.  Nasal bridge contusion and abrasion    Disposition  The patient will be discharged in stable condition     Electronically signed by: Ty Gage M.D., 1/10/2025 10:22 AM

## 2025-01-10 NOTE — ED TRIAGE NOTES
"Chief Complaint   Patient presents with    UTI     Began yesterday, states has chronic UTI hx, increased frequency, dysuria, darker color    N/V     Began this morning, vomiting +bile, last BM yesterday normal, hx gastroparesis     Facial Pain     This morning phone fell on L side of face, 8/10 cheek pain        Ambulated to triage with partner for above complaint.    Abd pain protocols ordered. Pt brought to [facility] office for blood draw. UA given. Pt educated of triage process and possible wait times. Pt informed to contact staff if status changes or with any developing concerns, pt returned to [facility].     /85   Pulse 96   Temp 36.5 °C (97.7 °F) (Temporal)   Resp 18   Ht 1.702 m (5' 7\")   Wt 46 kg (101 lb 6.6 oz)   SpO2 98%   BMI 15.88 kg/m²      "

## 2025-01-10 NOTE — ED NOTES
Discharge instructions provided to pt, all questions answered, pt verbalized understanding.  Ambulated to lobby with steady gait.      
psychiatric evaluation

## 2025-01-23 ENCOUNTER — RESEARCH ENCOUNTER (OUTPATIENT)
Dept: RESEARCH | Facility: MEDICAL CENTER | Age: 45
End: 2025-01-23

## 2025-02-14 ENCOUNTER — OFFICE VISIT (OUTPATIENT)
Dept: URGENT CARE | Facility: PHYSICIAN GROUP | Age: 45
End: 2025-02-14
Payer: COMMERCIAL

## 2025-02-14 ENCOUNTER — APPOINTMENT (OUTPATIENT)
Dept: RADIOLOGY | Facility: IMAGING CENTER | Age: 45
End: 2025-02-14
Attending: FAMILY MEDICINE
Payer: COMMERCIAL

## 2025-02-14 VITALS
RESPIRATION RATE: 20 BRPM | HEART RATE: 101 BPM | DIASTOLIC BLOOD PRESSURE: 62 MMHG | WEIGHT: 107.6 LBS | HEIGHT: 67 IN | OXYGEN SATURATION: 97 % | TEMPERATURE: 98.2 F | SYSTOLIC BLOOD PRESSURE: 100 MMHG | BODY MASS INDEX: 16.89 KG/M2

## 2025-02-14 DIAGNOSIS — R06.02 SOB (SHORTNESS OF BREATH): ICD-10-CM

## 2025-02-14 DIAGNOSIS — R05.1 ACUTE COUGH: ICD-10-CM

## 2025-02-14 DIAGNOSIS — J32.9 RHINOSINUSITIS: ICD-10-CM

## 2025-02-14 PROCEDURE — 71046 X-RAY EXAM CHEST 2 VIEWS: CPT | Mod: TC,FY | Performed by: RADIOLOGY

## 2025-02-14 PROCEDURE — 99213 OFFICE O/P EST LOW 20 MIN: CPT | Performed by: FAMILY MEDICINE

## 2025-02-14 RX ORDER — DOXYCYCLINE HYCLATE 100 MG
100 TABLET ORAL 2 TIMES DAILY
Qty: 14 TABLET | Refills: 0 | Status: SHIPPED | OUTPATIENT
Start: 2025-02-14 | End: 2025-02-21

## 2025-02-14 RX ORDER — DEXTROMETHORPHAN HYDROBROMIDE AND PROMETHAZINE HYDROCHLORIDE 15; 6.25 MG/5ML; MG/5ML
5 SYRUP ORAL 4 TIMES DAILY PRN
Qty: 120 ML | Refills: 0 | Status: SHIPPED | OUTPATIENT
Start: 2025-02-14

## 2025-02-14 ASSESSMENT — ENCOUNTER SYMPTOMS
WEIGHT LOSS: 0
EYE REDNESS: 0
DIARRHEA: 1
NAUSEA: 0
MYALGIAS: 1
EYE DISCHARGE: 1
VOMITING: 0

## 2025-02-14 ASSESSMENT — FIBROSIS 4 INDEX: FIB4 SCORE: 1.43

## 2025-02-14 NOTE — PROGRESS NOTES
"Subjective     Leola He is a 44 y.o. female who presents with Pharyngitis (X 6 days pain going through face ), Eye Problem (Swelling and watering ), Nasal Congestion, and Cough (Chest congestion and SOB pt does have asthma x 6 days getting worse /Has tried day quil with no relief )            6 days ST, sinus pressure and drainage. Onset last night chest congestion. Productive cough without blood in sputum. No fever. SOB. No wheeze. PMH asthma and pneumonia. No other aggravating or alleviating factors.          Review of Systems   Constitutional:  Positive for malaise/fatigue. Negative for weight loss.   Eyes:  Positive for discharge. Negative for redness.   Gastrointestinal:  Positive for diarrhea (initially, now resolved). Negative for nausea and vomiting.   Musculoskeletal:  Positive for myalgias. Negative for joint pain.   Skin:  Negative for itching and rash.              Objective     /62   Pulse (!) 101   Temp 36.8 °C (98.2 °F) (Temporal)   Resp 20   Ht 1.702 m (5' 7\")   Wt 48.8 kg (107 lb 9.6 oz)   SpO2 97%   BMI 16.85 kg/m²      Physical Exam  Constitutional:       General: She is not in acute distress.     Appearance: She is well-developed.   HENT:      Head: Normocephalic and atraumatic.      Right Ear: Tympanic membrane normal.      Left Ear: Tympanic membrane normal.      Nose: Congestion present.      Mouth/Throat:      Comments: PND  Eyes:      Extraocular Movements: Extraocular movements intact.      Conjunctiva/sclera: Conjunctivae normal.      Pupils: Pupils are equal, round, and reactive to light.   Cardiovascular:      Rate and Rhythm: Regular rhythm. Tachycardia present.      Heart sounds: Normal heart sounds. No murmur heard.  Pulmonary:      Effort: Pulmonary effort is normal.      Breath sounds: Normal breath sounds. No wheezing.   Skin:     General: Skin is warm and dry.      Findings: No rash.   Neurological:      Mental Status: She is alert.                       "          CXR: no acute cardiopulmonary process per radiology    1. Rhinosinusitis  doxycycline (VIBRAMYCIN) 100 MG Tab      2. Acute cough  DX-CHEST-2 VIEWS    promethazine-dextromethorphan (PROMETHAZINE-DM) 6.25-15 MG/5ML syrup      3. SOB (shortness of breath)  DX-CHEST-2 VIEWS        Nasal saline. Nasal corticosteroid.     Contingent antibiotic prescription given to patient to fill upon meeting criteria of guidelines discussed.     Differential diagnosis, natural history, supportive care, and indications for immediate follow-up were discussed.